# Patient Record
Sex: FEMALE | Race: OTHER | NOT HISPANIC OR LATINO | Employment: UNEMPLOYED | URBAN - METROPOLITAN AREA
[De-identification: names, ages, dates, MRNs, and addresses within clinical notes are randomized per-mention and may not be internally consistent; named-entity substitution may affect disease eponyms.]

---

## 2018-09-28 ENCOUNTER — OCCUPATIONAL HEALTH (OUTPATIENT)
Dept: URGENT CARE | Facility: CLINIC | Age: 48
End: 2018-09-28

## 2018-09-28 DIAGNOSIS — Z02.1 PRE-EMPLOYMENT DRUG SCREENING: Primary | ICD-10-CM

## 2018-09-28 PROCEDURE — 80305 DRUG TEST PRSMV DIR OPT OBS: CPT | Mod: S$GLB,,, | Performed by: NURSE PRACTITIONER

## 2020-11-25 ENCOUNTER — HOSPITAL ENCOUNTER (EMERGENCY)
Facility: HOSPITAL | Age: 50
Discharge: HOME | End: 2020-11-25
Attending: EMERGENCY MEDICINE

## 2020-11-25 VITALS
HEIGHT: 65 IN | WEIGHT: 122 LBS | TEMPERATURE: 99 F | DIASTOLIC BLOOD PRESSURE: 55 MMHG | BODY MASS INDEX: 20.33 KG/M2 | HEART RATE: 62 BPM | OXYGEN SATURATION: 100 % | SYSTOLIC BLOOD PRESSURE: 109 MMHG | RESPIRATION RATE: 18 BRPM

## 2020-11-25 DIAGNOSIS — M54.9 BACK PAIN, UNSPECIFIED BACK LOCATION, UNSPECIFIED BACK PAIN LATERALITY, UNSPECIFIED CHRONICITY: Primary | ICD-10-CM

## 2020-11-25 DIAGNOSIS — R10.9 ABDOMINAL PAIN, UNSPECIFIED ABDOMINAL LOCATION: ICD-10-CM

## 2020-11-25 LAB
ALBUMIN SERPL BCP-MCNC: 4.8 G/DL (ref 3.5–5.2)
ALP SERPL-CCNC: 126 U/L (ref 55–135)
ALT SERPL W/O P-5'-P-CCNC: 19 U/L (ref 10–44)
ANION GAP SERPL CALC-SCNC: 12 MMOL/L (ref 8–16)
AST SERPL-CCNC: 30 U/L (ref 10–40)
BASOPHILS # BLD AUTO: 0.04 K/UL (ref 0–0.2)
BASOPHILS NFR BLD: 0.5 % (ref 0–1.9)
BILIRUB SERPL-MCNC: 0.5 MG/DL (ref 0.1–1)
BUN SERPL-MCNC: 11 MG/DL (ref 6–20)
CALCIUM SERPL-MCNC: 9.5 MG/DL (ref 8.7–10.5)
CHLORIDE SERPL-SCNC: 107 MMOL/L (ref 95–110)
CO2 SERPL-SCNC: 21 MMOL/L (ref 23–29)
CREAT SERPL-MCNC: 1 MG/DL (ref 0.5–1.4)
DIFFERENTIAL METHOD: ABNORMAL
EOSINOPHIL # BLD AUTO: 0.1 K/UL (ref 0–0.5)
EOSINOPHIL NFR BLD: 0.8 % (ref 0–8)
ERYTHROCYTE [DISTWIDTH] IN BLOOD BY AUTOMATED COUNT: 14.1 % (ref 11.5–14.5)
EST. GFR  (AFRICAN AMERICAN): >60 ML/MIN/1.73 M^2
EST. GFR  (NON AFRICAN AMERICAN): >60 ML/MIN/1.73 M^2
GLUCOSE SERPL-MCNC: 139 MG/DL (ref 70–110)
HCT VFR BLD AUTO: 36.5 % (ref 37–48.5)
HGB BLD-MCNC: 11.5 G/DL (ref 12–16)
IMM GRANULOCYTES # BLD AUTO: 0.02 K/UL (ref 0–0.04)
IMM GRANULOCYTES NFR BLD AUTO: 0.3 % (ref 0–0.5)
LIPASE SERPL-CCNC: 26 U/L (ref 4–60)
LYMPHOCYTES # BLD AUTO: 1.8 K/UL (ref 1–4.8)
LYMPHOCYTES NFR BLD: 23.6 % (ref 18–48)
MCH RBC QN AUTO: 26 PG (ref 27–31)
MCHC RBC AUTO-ENTMCNC: 31.5 G/DL (ref 32–36)
MCV RBC AUTO: 82 FL (ref 82–98)
MONOCYTES # BLD AUTO: 0.6 K/UL (ref 0.3–1)
MONOCYTES NFR BLD: 7.7 % (ref 4–15)
NEUTROPHILS # BLD AUTO: 5.1 K/UL (ref 1.8–7.7)
NEUTROPHILS NFR BLD: 67.1 % (ref 38–73)
NRBC BLD-RTO: 0 /100 WBC
PLATELET # BLD AUTO: 286 K/UL (ref 150–350)
PMV BLD AUTO: 11.4 FL (ref 9.2–12.9)
POTASSIUM SERPL-SCNC: 3.3 MMOL/L (ref 3.5–5.1)
PROT SERPL-MCNC: 7.5 G/DL (ref 6–8.4)
RBC # BLD AUTO: 4.43 M/UL (ref 4–5.4)
SODIUM SERPL-SCNC: 140 MMOL/L (ref 136–145)
WBC # BLD AUTO: 7.66 K/UL (ref 3.9–12.7)

## 2020-11-25 PROCEDURE — 83690 ASSAY OF LIPASE: CPT

## 2020-11-25 PROCEDURE — 80053 COMPREHEN METABOLIC PANEL: CPT

## 2020-11-25 PROCEDURE — 99283 EMERGENCY DEPT VISIT LOW MDM: CPT

## 2020-11-25 PROCEDURE — 85025 COMPLETE CBC W/AUTO DIFF WBC: CPT

## 2020-11-25 RX ORDER — ALPRAZOLAM 0.25 MG/1
0.25 TABLET ORAL 3 TIMES DAILY PRN
Qty: 6 TABLET | Refills: 0 | Status: SHIPPED | OUTPATIENT
Start: 2020-11-25 | End: 2021-05-13 | Stop reason: ALTCHOICE

## 2020-11-25 RX ORDER — METHOCARBAMOL 500 MG/1
1000 TABLET, FILM COATED ORAL 4 TIMES DAILY
Qty: 40 TABLET | Refills: 0 | Status: SHIPPED | OUTPATIENT
Start: 2020-11-25 | End: 2020-11-30

## 2020-11-25 RX ORDER — OXYCODONE AND ACETAMINOPHEN 10; 325 MG/1; MG/1
1 TABLET ORAL EVERY 6 HOURS PRN
Qty: 12 TABLET | Refills: 0 | Status: SHIPPED | OUTPATIENT
Start: 2020-11-25 | End: 2020-11-28

## 2020-11-26 NOTE — ED PROVIDER NOTES
Encounter Date: 11/25/2020       History     Chief Complaint   Patient presents with    Abdominal Pain     LLQ x5 days    Fall     Slipped in rain and almost fell and in the process of catching herself injured her neck, back pain along spine, and bilateral shoulders.      CHIEF COMPLAINT IS LOWER BACK NECK AND SHOULDER DISCOMFORT.  THE PATIENT STATES SHE TWISTED HER BODY TRYING TO HOLD HER BALANCE  .  She did not fall.  This occurred today.  She has been having soreness to the areas to the last 7 days.  She also today noted some left lower quadrant sharp stabbing pain that is intermittent in nature lasting 50 seconds.  She denies fever chills earache sore throat runny nose.  She denies chest pain productive cough.  She denies nausea vomiting diarrhea trouble urinating.  The patient denies any troubles with her back in the past.  She does carry a heavy bag she states.  She denies any trauma.  She denies any acute weight loss.  She denies any neurological deficits.  She denies any fever IV drug use or steroid use.  She has no history of cancer.        Review of patient's allergies indicates:   Allergen Reactions    Hydrocodone Itching    Benadryl [diphenhydramine hcl] Other (See Comments)     Agitation      No past medical history on file.  No past surgical history on file.  No family history on file.  Social History     Tobacco Use    Smoking status: Not on file   Substance Use Topics    Alcohol use: Not on file    Drug use: Not on file     Review of Systems   Constitutional: Negative for chills and fever.   HENT: Negative for ear pain, rhinorrhea and sore throat.    Eyes: Negative for pain and visual disturbance.   Respiratory: Negative for cough and shortness of breath.    Cardiovascular: Negative for chest pain and palpitations.   Gastrointestinal: Negative for abdominal pain, constipation, diarrhea, nausea and vomiting.   Genitourinary: Negative for dysuria, frequency, hematuria and urgency.    Musculoskeletal: Negative for back pain, joint swelling and myalgias.        Lower back bilateral shoulder and neck pain   Skin: Negative for rash.   Neurological: Negative for dizziness, seizures, weakness and headaches.   Psychiatric/Behavioral: Negative for dysphoric mood. The patient is not nervous/anxious.        Physical Exam     Initial Vitals [11/25/20 1939]   BP Pulse Resp Temp SpO2   (!) 134/91 85 16 98.3 °F (36.8 °C) 96 %      MAP       --         Physical Exam    Nursing note and vitals reviewed.  Constitutional: She appears well-developed and well-nourished.   HENT:   Head: Normocephalic and atraumatic.   Eyes: Conjunctivae, EOM and lids are normal. Pupils are equal, round, and reactive to light.   Neck: Trachea normal and normal range of motion. Neck supple. No thyroid mass and no thyromegaly present.   Cardiovascular: Normal rate, regular rhythm and normal heart sounds.   Pulmonary/Chest: Effort normal and breath sounds normal.   Abdominal: Soft. Normal appearance and bowel sounds are normal. There is no abdominal tenderness.   Musculoskeletal: Normal range of motion.      Comments: Patient with musculoskeletal back pain paravertebral area L2-L5.  She also is slightly sore over the scapular areas bilaterally with no skin changes no bruising no swelling.  Full range of motion of the shoulders.  No sciatica on testing.  Good motor strength to the lower extremities.  Patient a and O x3 cranial nerves 2-12 intact good motor sensory to all extremities   Neurological: She is alert and oriented to person, place, and time. She has normal strength and normal reflexes. No cranial nerve deficit or sensory deficit.   Skin: Skin is warm and dry.   Psychiatric: She has a normal mood and affect. Her speech is normal and behavior is normal. Judgment and thought content normal.         ED Course   Procedures  Labs Reviewed   CBC W/ AUTO DIFFERENTIAL - Abnormal; Notable for the following components:       Result  Value    Hemoglobin 11.5 (*)     Hematocrit 36.5 (*)     MCH 26.0 (*)     MCHC 31.5 (*)     All other components within normal limits   COMPREHENSIVE METABOLIC PANEL - Abnormal; Notable for the following components:    Potassium 3.3 (*)     CO2 21 (*)     Glucose 139 (*)     All other components within normal limits   LIPASE   URINALYSIS, REFLEX TO URINE CULTURE          Imaging Results    None          Medical Decision Making:   ED Management:  The patient will be discharged with muscle relaxer and pain meds and follow-up.  She can follow up with her family doctor Dr. Cottrell so she get an outpatient colonoscopy for intermittent left lower quadrant pain.  During my exam and during her stay here she had no abdominal pain whatsoever.                             Clinical Impression:     ICD-10-CM ICD-9-CM   1. Back pain, unspecified back location, unspecified back pain laterality, unspecified chronicity  M54.9 724.5   2. Abdominal pain, unspecified abdominal location  R10.9 789.00                          ED Disposition Condition    Discharge Stable        ED Prescriptions     Medication Sig Dispense Start Date End Date Auth. Provider    oxyCODONE-acetaminophen (PERCOCET)  mg per tablet Take 1 tablet by mouth every 6 (six) hours as needed for Pain. 12 tablet 11/25/2020 11/28/2020 Von Mcghee MD    methocarbamoL (ROBAXIN) 500 MG Tab Take 2 tablets (1,000 mg total) by mouth 4 (four) times daily. for 5 days 40 tablet 11/25/2020 11/30/2020 Von Mcghee MD        Follow-up Information    None                                      Von Mcghee MD  11/25/20 7630

## 2021-04-08 NOTE — PROGRESS NOTES
[facility]   Cardiology II Service / Advanced Heart Failure  Daily Progress Note      Patient: Jim Willingham  MRN: [id number]  Admission Date: 2/8/2021  Hospital Day # 59    Assessment and Plan:  Mr. Jim Willingham is a 61yr old male with a history of NICM (LVEF < 30% per TTE 1/2021), s/p HM2 LVAD (6/2017), VT, AFib, DMII, CKD, and COPD who presented with worsening symptoms and for consideration of heart transplantation.  He has been listed for transplant, and is currently status 2E, BG O+.  His exception expires 4/8.  He has demonstrated refractory hypervolemia in spite of aggressive medication titration.    PLAN:  - no changes to plan of care today    Chronic systolic heart failure secondary to NCM (LVEF < 30% per TTE 1/2021)  Stage D, NYHA Class III  - ACEi/ARB/ARNi:  Deferred due to renal function  - Afterload reduction:  Hydralazine 75mg TID and isordil 10mg TID  - BB:  Contraindicated due to low cardiac output  - Aldosterone antagonist:  Deferred due to renal function  - SCD ppx:  CRT-D  - Fluid status:  euvolemic- continue bumex 4mg po twice daily     S/p HMII LVAD (6/2017)  - Antiplatelet:  Aspirin 81mg daily  - Anticoagulation:  Warfarin, dosed per pharmacy with goal INR 2.5-3 (goal increased due to power spikes/elevated flows during this hospitalization).  INR today 2.11   - MAPs:  Goal 65-85, overall at goal  - LDH: 309 (4/2), stable     VT  AFib  HRs controlled.  - Continue amiodarone 200mg once daily  - BB deferred as noted above  - continue warfarin, as noted above     WALTER on CKD III, resolved  Creatinine 2s on admission, has improved and stabilized, ranging ~1.4-1.9 overall.  - continue diuresis, as noted above  - continue to trend BMP daily     DMII  Low cortisol levels  Concern for hypoglycemia, resolved  H/o bariatric surgery  Last HgbA1c 1/2021 was 6.2%.  Has been on prednisone for management of carpal tunnel syndrome for about 7 years, tapered down from 9/2020 and ultimately stopped  Non dot ds onsite [facility]      2/2021.  Developed symptomatic hypoglycemia (BGs 50s on 3/4), so endocrine was consulted.  C-peptide was 13.9, proinsulin was 18.4 (3/4, he was normoglycemic with post-prandial glucose 84 at that time).  Serum cortisol was 7.7 (3/6), cosnytopin stim test was WNL.  Lantus and sliding scale insulin have been discontinued.  He has continued to have hypoglycemia, so was started on acarbose 4/2.  - endocrine consult, appreciate rec's  - continue to monitor BGs q4 hours  - continue acarbose 25mg daily  - per endo --> Fingerstick glucose readings might not be accurate for assessment of hypoglycemia, if the patient has another glucose reading <55 (without insulin therapy), plasma glucose should be sent for confirmation of hypoglycemia prior to correction.  While the patient is off insulin therapy, POC glucose readings above 60 are acceptable for the patient        OTHER:  Carpel tunnel, bilateral:  Evidence of thenar atrophy, s/p steroid injection 11/2020.  S/p bilateral release 2/18/21.  Appreciate ortho/plastics consults, signed off.  Continue gabapentin 300mg/600mg/600mg and tylenol as needed.  COPD/asthma:  Continue PTA breo ellipta, breo incruse, and albuterol as needed.  Rhinovirus, resolved:  S/p 5-day course of cefdinir.  Repeat respiratory virus panel was negative.  Depression:  Continue PTA buproprion.  Staph Hominis Bacteremia:  No need for surveillance blood cx per transplant ID.   Acute Gout flare:  Anakinra for daily ppx and allopurinol, per rheum.      FEN: 3 gram sodium diet   PROPHY:  Coumadin  LINES:  PIV   DISPO:  TBD pending cardiac transplant.   CODE STATUS: Full Code     ============================================================    Interval History/ROS:   Mr. Willingham states that he feels well today. His breathing has been good, and he denies SOB, PND, and orthopnea. No bloating. No Le edema. Mild headache. One episode of dizziness yesterday when he bent down to the fridge, no other lightheadedness.  He  "is eating, with good appetite and denies nausea, vomiting, diarrhea, and constipation.  He  Continues to deny chest pain, palpitations, headaches, acute vision changes, fevers, chills, cough, sore throat, and signs of bleeding.    Last 24 hr care team notes reviewed.   ROS:  4 point ROS including Respiratory, CV, GI and , other than that noted in the HPI, is negative.     Medications: Reviewed in EPIC.     Physical Exam:   BP (!) 79/65   Pulse 71   Temp 97.5  F (36.4  C) (Oral)   Resp 16   Ht 1.727 m (5' 8\")   Wt 99.5 kg (219 lb 4.8 oz)   SpO2 99%   BMI 33.34 kg/m    GENERAL: Appears alert and interacting appropriatly. Sitting upright at edge of bed, NAD.  HEENT: Eye symmetrical and free of discharge bilaterally. Mucous membranes moist and without lesions.  NECK: Supple and without lymphadenopathy. JVD lower third of neck at 45 degrees  CV: +LVAD hum  RESPIRATORY: Respirations regular, even, and unlabored. Lungs CTA throughout.   GI: Soft and non distended with normoactive bowel sounds present in all quadrants. No tenderness, rebound, guarding. No organomegaly.   EXTREMITIES: No LE edema. All extremities are warm and well perfused.  NEUROLOGIC: Alert and interacting appropriatly. No focal deficits.   MUSCULOSKELETAL: No joint swelling or tenderness.   SKIN: No jaundice. No rashes or lesions. Driveline covered, dressing c/d/i..    Data:  CMP  Recent Labs   Lab 04/08/21  0551 04/07/21  0617 04/06/21  0548 04/05/21  0536    136 135 135   POTASSIUM 4.0 4.1 3.9 3.7   CHLORIDE 102 103 102 102   CO2 27 27 26 28   ANIONGAP 8 6 7 6   GLC 89 96 108* 97   BUN 49* 49* 46* 44*   CR 1.58* 1.68* 1.72* 1.91*   GFRESTIMATED 46* 42* 41* 36*   GFRESTBLACK 53* 49* 48* 42*   QAMAR 8.9 8.9 8.5 8.6   MAG 2.6* 2.6* 2.5* 2.5*   PROTTOTAL  --   --   --  6.4*   ALBUMIN  --   --   --  3.6   BILITOTAL  --   --   --  0.5   ALKPHOS  --   --   --  108   AST  --   --   --  35   ALT  --   --   --  40     CBC  Recent Labs   Lab " 04/08/21  0551 04/05/21  0536 04/02/21  0651   WBC 4.3 3.9* 3.7*   RBC 4.48 4.12* 4.18*   HGB 13.5 12.4* 12.2*   HCT 42.7 38.7* 38.6*   MCV 95 94 92   MCH 30.1 30.1 29.2   MCHC 31.6 32.0 31.6   RDW 15.9* 15.9* 16.3*    185 190     INR  Recent Labs   Lab 04/08/21  0551 04/07/21  0617 04/06/21  0548 04/05/21  0536   INR 2.11* 2.32* 2.48* 2.95*       Patient discussed with Dr. Montgomery.      Didi Butler PA-C  [facility] Cardiology

## 2021-05-13 ENCOUNTER — HOSPITAL ENCOUNTER (EMERGENCY)
Facility: HOSPITAL | Age: 51
Discharge: HOME | End: 2021-05-13
Attending: EMERGENCY MEDICINE

## 2021-05-13 VITALS
WEIGHT: 121.94 LBS | DIASTOLIC BLOOD PRESSURE: 67 MMHG | RESPIRATION RATE: 16 BRPM | BODY MASS INDEX: 20.29 KG/M2 | TEMPERATURE: 98 F | HEART RATE: 53 BPM | SYSTOLIC BLOOD PRESSURE: 148 MMHG | OXYGEN SATURATION: 100 %

## 2021-05-13 DIAGNOSIS — G89.29 CHRONIC RIGHT-SIDED LOW BACK PAIN WITH RIGHT-SIDED SCIATICA: Primary | ICD-10-CM

## 2021-05-13 DIAGNOSIS — M54.41 CHRONIC RIGHT-SIDED LOW BACK PAIN WITH RIGHT-SIDED SCIATICA: Primary | ICD-10-CM

## 2021-05-13 PROCEDURE — 63600175 PHARM REV CODE 636 W HCPCS: Performed by: EMERGENCY MEDICINE

## 2021-05-13 PROCEDURE — 96372 THER/PROPH/DIAG INJ SC/IM: CPT

## 2021-05-13 PROCEDURE — 99284 EMERGENCY DEPT VISIT MOD MDM: CPT | Mod: 25

## 2021-05-13 RX ORDER — KETOROLAC TROMETHAMINE 30 MG/ML
15 INJECTION, SOLUTION INTRAMUSCULAR; INTRAVENOUS
Status: COMPLETED | OUTPATIENT
Start: 2021-05-13 | End: 2021-05-13

## 2021-05-13 RX ORDER — KETOROLAC TROMETHAMINE 10 MG/1
10 TABLET, FILM COATED ORAL EVERY 6 HOURS
Qty: 12 TABLET | Refills: 0 | Status: SHIPPED | OUTPATIENT
Start: 2021-05-13 | End: 2021-05-16

## 2021-05-13 RX ORDER — HYDROCODONE BITARTRATE AND ACETAMINOPHEN 5; 325 MG/1; MG/1
1 TABLET ORAL EVERY 6 HOURS PRN
COMMUNITY
End: 2023-02-01

## 2021-05-13 RX ORDER — DIAZEPAM 5 MG/1
5 TABLET ORAL EVERY 6 HOURS PRN
Qty: 10 TABLET | Refills: 0 | Status: SHIPPED | OUTPATIENT
Start: 2021-05-13 | End: 2023-02-01

## 2021-05-13 RX ADMIN — KETOROLAC TROMETHAMINE 15 MG: 30 INJECTION, SOLUTION INTRAMUSCULAR; INTRAVENOUS at 03:05

## 2021-05-14 ENCOUNTER — PATIENT OUTREACH (OUTPATIENT)
Dept: EMERGENCY MEDICINE | Facility: HOSPITAL | Age: 51
End: 2021-05-14

## 2021-08-03 DIAGNOSIS — M54.12 CERVICAL RADICULOPATHY: ICD-10-CM

## 2021-08-03 DIAGNOSIS — M46.1 SACROILIITIS: Primary | ICD-10-CM

## 2022-09-27 ENCOUNTER — OCCUPATIONAL HEALTH (OUTPATIENT)
Dept: URGENT CARE | Facility: CLINIC | Age: 52
End: 2022-09-27

## 2022-09-27 PROCEDURE — 80305 DRUG TEST PRSMV DIR OPT OBS: CPT | Mod: S$GLB,,, | Performed by: EMERGENCY MEDICINE

## 2022-09-27 PROCEDURE — 80305 PR COLLECTION ONLY DRUG SCREEN: ICD-10-PCS | Mod: S$GLB,,, | Performed by: EMERGENCY MEDICINE

## 2023-02-01 ENCOUNTER — LAB VISIT (OUTPATIENT)
Dept: PRIMARY CARE CLINIC | Facility: CLINIC | Age: 53
End: 2023-02-01
Payer: COMMERCIAL

## 2023-02-01 ENCOUNTER — OFFICE VISIT (OUTPATIENT)
Dept: PRIMARY CARE CLINIC | Facility: CLINIC | Age: 53
End: 2023-02-01
Payer: COMMERCIAL

## 2023-02-01 VITALS
HEART RATE: 60 BPM | HEIGHT: 65 IN | DIASTOLIC BLOOD PRESSURE: 77 MMHG | SYSTOLIC BLOOD PRESSURE: 138 MMHG | BODY MASS INDEX: 18.85 KG/M2 | WEIGHT: 113.13 LBS | OXYGEN SATURATION: 100 %

## 2023-02-01 DIAGNOSIS — D50.9 IRON DEFICIENCY ANEMIA, UNSPECIFIED IRON DEFICIENCY ANEMIA TYPE: ICD-10-CM

## 2023-02-01 DIAGNOSIS — E87.6 HYPOKALEMIA: ICD-10-CM

## 2023-02-01 DIAGNOSIS — F41.9 ANXIETY: ICD-10-CM

## 2023-02-01 DIAGNOSIS — E87.6 HYPOKALEMIA: Primary | ICD-10-CM

## 2023-02-01 DIAGNOSIS — Z13.1 DIABETES MELLITUS SCREENING: ICD-10-CM

## 2023-02-01 LAB
ALBUMIN SERPL BCP-MCNC: 4.4 G/DL (ref 3.5–5.2)
ALP SERPL-CCNC: 140 U/L (ref 55–135)
ALT SERPL W/O P-5'-P-CCNC: 15 U/L (ref 10–44)
ANION GAP SERPL CALC-SCNC: 11 MMOL/L (ref 8–16)
AST SERPL-CCNC: 23 U/L (ref 10–40)
BASOPHILS # BLD AUTO: 0.03 K/UL (ref 0–0.2)
BASOPHILS NFR BLD: 0.4 % (ref 0–1.9)
BILIRUB SERPL-MCNC: 0.2 MG/DL (ref 0.1–1)
BUN SERPL-MCNC: 6 MG/DL (ref 6–20)
CALCIUM SERPL-MCNC: 9.7 MG/DL (ref 8.7–10.5)
CHLORIDE SERPL-SCNC: 105 MMOL/L (ref 95–110)
CO2 SERPL-SCNC: 23 MMOL/L (ref 23–29)
CREAT SERPL-MCNC: 0.8 MG/DL (ref 0.5–1.4)
DIFFERENTIAL METHOD: ABNORMAL
EOSINOPHIL # BLD AUTO: 0 K/UL (ref 0–0.5)
EOSINOPHIL NFR BLD: 0.6 % (ref 0–8)
ERYTHROCYTE [DISTWIDTH] IN BLOOD BY AUTOMATED COUNT: 15.2 % (ref 11.5–14.5)
EST. GFR  (NO RACE VARIABLE): >60 ML/MIN/1.73 M^2
ESTIMATED AVG GLUCOSE: 105 MG/DL (ref 68–131)
GLUCOSE SERPL-MCNC: 79 MG/DL (ref 70–110)
HBA1C MFR BLD: 5.3 % (ref 4–5.6)
HCT VFR BLD AUTO: 39.4 % (ref 37–48.5)
HGB BLD-MCNC: 12.1 G/DL (ref 12–16)
IMM GRANULOCYTES # BLD AUTO: 0.01 K/UL (ref 0–0.04)
IMM GRANULOCYTES NFR BLD AUTO: 0.1 % (ref 0–0.5)
LYMPHOCYTES # BLD AUTO: 2.1 K/UL (ref 1–4.8)
LYMPHOCYTES NFR BLD: 31.2 % (ref 18–48)
MCH RBC QN AUTO: 25.4 PG (ref 27–31)
MCHC RBC AUTO-ENTMCNC: 30.7 G/DL (ref 32–36)
MCV RBC AUTO: 83 FL (ref 82–98)
MONOCYTES # BLD AUTO: 0.6 K/UL (ref 0.3–1)
MONOCYTES NFR BLD: 8.2 % (ref 4–15)
NEUTROPHILS # BLD AUTO: 4 K/UL (ref 1.8–7.7)
NEUTROPHILS NFR BLD: 59.5 % (ref 38–73)
NRBC BLD-RTO: 0 /100 WBC
PLATELET # BLD AUTO: 384 K/UL (ref 150–450)
PMV BLD AUTO: 12 FL (ref 9.2–12.9)
POTASSIUM SERPL-SCNC: 4.6 MMOL/L (ref 3.5–5.1)
PROT SERPL-MCNC: 7.9 G/DL (ref 6–8.4)
RBC # BLD AUTO: 4.76 M/UL (ref 4–5.4)
SODIUM SERPL-SCNC: 139 MMOL/L (ref 136–145)
TSH SERPL DL<=0.005 MIU/L-ACNC: 0.97 UIU/ML (ref 0.4–4)
WBC # BLD AUTO: 6.79 K/UL (ref 3.9–12.7)

## 2023-02-01 PROCEDURE — 99999 PR PBB SHADOW E&M-EST. PATIENT-LVL III: ICD-10-PCS | Mod: PBBFAC,,, | Performed by: STUDENT IN AN ORGANIZED HEALTH CARE EDUCATION/TRAINING PROGRAM

## 2023-02-01 PROCEDURE — 3078F PR MOST RECENT DIASTOLIC BLOOD PRESSURE < 80 MM HG: ICD-10-PCS | Mod: CPTII,S$GLB,, | Performed by: STUDENT IN AN ORGANIZED HEALTH CARE EDUCATION/TRAINING PROGRAM

## 2023-02-01 PROCEDURE — 3044F HG A1C LEVEL LT 7.0%: CPT | Mod: CPTII,S$GLB,, | Performed by: STUDENT IN AN ORGANIZED HEALTH CARE EDUCATION/TRAINING PROGRAM

## 2023-02-01 PROCEDURE — 3008F PR BODY MASS INDEX (BMI) DOCUMENTED: ICD-10-PCS | Mod: CPTII,S$GLB,, | Performed by: STUDENT IN AN ORGANIZED HEALTH CARE EDUCATION/TRAINING PROGRAM

## 2023-02-01 PROCEDURE — 99203 OFFICE O/P NEW LOW 30 MIN: CPT | Mod: S$GLB,,, | Performed by: STUDENT IN AN ORGANIZED HEALTH CARE EDUCATION/TRAINING PROGRAM

## 2023-02-01 PROCEDURE — 3008F BODY MASS INDEX DOCD: CPT | Mod: CPTII,S$GLB,, | Performed by: STUDENT IN AN ORGANIZED HEALTH CARE EDUCATION/TRAINING PROGRAM

## 2023-02-01 PROCEDURE — 99203 PR OFFICE/OUTPT VISIT, NEW, LEVL III, 30-44 MIN: ICD-10-PCS | Mod: S$GLB,,, | Performed by: STUDENT IN AN ORGANIZED HEALTH CARE EDUCATION/TRAINING PROGRAM

## 2023-02-01 PROCEDURE — 1159F MED LIST DOCD IN RCRD: CPT | Mod: CPTII,S$GLB,, | Performed by: STUDENT IN AN ORGANIZED HEALTH CARE EDUCATION/TRAINING PROGRAM

## 2023-02-01 PROCEDURE — 84443 ASSAY THYROID STIM HORMONE: CPT | Performed by: STUDENT IN AN ORGANIZED HEALTH CARE EDUCATION/TRAINING PROGRAM

## 2023-02-01 PROCEDURE — 3078F DIAST BP <80 MM HG: CPT | Mod: CPTII,S$GLB,, | Performed by: STUDENT IN AN ORGANIZED HEALTH CARE EDUCATION/TRAINING PROGRAM

## 2023-02-01 PROCEDURE — 80053 COMPREHEN METABOLIC PANEL: CPT | Performed by: STUDENT IN AN ORGANIZED HEALTH CARE EDUCATION/TRAINING PROGRAM

## 2023-02-01 PROCEDURE — 3075F SYST BP GE 130 - 139MM HG: CPT | Mod: CPTII,S$GLB,, | Performed by: STUDENT IN AN ORGANIZED HEALTH CARE EDUCATION/TRAINING PROGRAM

## 2023-02-01 PROCEDURE — 3075F PR MOST RECENT SYSTOLIC BLOOD PRESS GE 130-139MM HG: ICD-10-PCS | Mod: CPTII,S$GLB,, | Performed by: STUDENT IN AN ORGANIZED HEALTH CARE EDUCATION/TRAINING PROGRAM

## 2023-02-01 PROCEDURE — 99999 PR PBB SHADOW E&M-EST. PATIENT-LVL III: CPT | Mod: PBBFAC,,, | Performed by: STUDENT IN AN ORGANIZED HEALTH CARE EDUCATION/TRAINING PROGRAM

## 2023-02-01 PROCEDURE — 85025 COMPLETE CBC W/AUTO DIFF WBC: CPT | Performed by: STUDENT IN AN ORGANIZED HEALTH CARE EDUCATION/TRAINING PROGRAM

## 2023-02-01 PROCEDURE — 83036 HEMOGLOBIN GLYCOSYLATED A1C: CPT | Performed by: STUDENT IN AN ORGANIZED HEALTH CARE EDUCATION/TRAINING PROGRAM

## 2023-02-01 PROCEDURE — 36415 COLL VENOUS BLD VENIPUNCTURE: CPT | Performed by: STUDENT IN AN ORGANIZED HEALTH CARE EDUCATION/TRAINING PROGRAM

## 2023-02-01 PROCEDURE — 3044F PR MOST RECENT HEMOGLOBIN A1C LEVEL <7.0%: ICD-10-PCS | Mod: CPTII,S$GLB,, | Performed by: STUDENT IN AN ORGANIZED HEALTH CARE EDUCATION/TRAINING PROGRAM

## 2023-02-01 PROCEDURE — 1159F PR MEDICATION LIST DOCUMENTED IN MEDICAL RECORD: ICD-10-PCS | Mod: CPTII,S$GLB,, | Performed by: STUDENT IN AN ORGANIZED HEALTH CARE EDUCATION/TRAINING PROGRAM

## 2023-02-01 RX ORDER — VENLAFAXINE HYDROCHLORIDE 37.5 MG/1
37.5 CAPSULE, EXTENDED RELEASE ORAL DAILY
Qty: 30 CAPSULE | Refills: 2 | Status: SHIPPED | OUTPATIENT
Start: 2023-02-01 | End: 2023-03-03 | Stop reason: SINTOL

## 2023-02-01 NOTE — PROGRESS NOTES
02/01/2023    Rehana Fabienne  [id number]    Chief Complaint   Patient presents with    \[facility] [place] [facility]\"" Care              HPI    This pt is new to me and presents for worsening anxiety over the years. Pt is currently on no chronic medications. Pmh of lower back pain.      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 has nelly increasing over the last 5 years after two divorces  Sleep is off and on through the night; no sleep medication  Diet: intake fluctuates, fast food   Hx of taking lexapro years ago after a divorce   Did not find lexapro helpful and does not recall a good experience with the other SSRI   Is more a natural person and does not like the idea of medication; is not currently taking any vitamins or natural substances  No hx of therapy and does not think that would be a good fit because she is a very private person  Exercise: not as much as before  Concentration is decreased: trouble with numbers and names      Negative 10 point ROS outside of HPI    Social History     Socioeconomic History    Marital status: Single   Tobacco Use    Smoking status: Never   Substance and Sexual Activity    Alcohol use: Yes     Comment: socially    Drug use: Not Currently    Sexual activity: Yes     Partners: Male         No current outpatient medications on file.      Physical Exam  Vitals:    02/01/23 1121   BP: 138/77   Pulse: 60     Gen: well appearing, NAD  Resp: non labored breathing, no crackles, no wheezes, CTAB  CV: RRR no murmur, gallops, rubs, no LE edema  Abd: soft nontender BS present no organomegaly    1. Hx of Hypokalemia  - Comprehensive Metabolic Panel; Future    2. Iron deficiency anemia, unspecified iron deficiency anemia type  - CBC Auto Differential; Future    3. Diabetes mellitus screening  - Hemoglobin A1C; Future    4. Anxiety  - TSH; Future  - START venlafaxine (EFFEXOR-XR) 37.5 MG 24 hr capsule; Take 1 capsule  (37.5 mg total) by mouth once daily.  Dispense: 30 capsule; Refill: 2  Recommended therapy; pt decline at this time  Recommended lifestyle changes including exercising and well balanced diet    RTC in 4 weeks for medication check    Savana Acevedo MD  Family Medicine       O-T Plasty Text: The defect edges were debeveled with a #15 scalpel blade.  Given the location of the defect, shape of the defect and the proximity to free margins an O-T plasty was deemed most appropriate.  Using a sterile surgical marker, an appropriate O-T plasty was drawn incorporating the defect and placing the expected incisions within the relaxed skin tension lines where possible.    The area thus outlined was incised deep to adipose tissue with a #15 scalpel blade.  The skin margins were undermined to an appropriate distance in all directions utilizing iris scissors.

## 2023-02-06 NOTE — PROGRESS NOTES
Please let patient know that her labs are good/stable when compared to previous and can be repeated as needed.

## 2023-02-23 ENCOUNTER — TELEPHONE (OUTPATIENT)
Dept: PRIMARY CARE CLINIC | Facility: CLINIC | Age: 53
End: 2023-02-23
Payer: COMMERCIAL

## 2023-02-23 NOTE — TELEPHONE ENCOUNTER
Pt states effexor is not working and she is stopping it, states she needs something called in for UTI ASAP.

## 2023-02-23 NOTE — TELEPHONE ENCOUNTER
Pt requires so type of visit; we do not prescribe antibiotics without a visit. She can be schedule for a virtual with any available provider

## 2023-02-23 NOTE — TELEPHONE ENCOUNTER
----- Message from Ludmila Jaeger sent at 2/23/2023  9:43 AM CST -----  Regarding: Request for Sooner Apt  Type:  Sooner Appointment Request    Patient is requesting a sooner appointment.  Patient declined first available appointment listed as well as another facility and provider .  Patient will not accept being placed on the waitlist and is requesting a message be sent to doctor.    Name of Caller: Self     When is the first available appointment? 3/15/23    Symptoms: UTI Said that she has called several times and never heard back.     Would the patient rather a call back or a response via [facility]? Call     Best Call Back Number: .[phone]    Urine is cloudy

## 2023-02-23 NOTE — TELEPHONE ENCOUNTER
----- Message from Mickie Hale sent at 2/23/2023 10:51 AM CST -----  Contact: [phone] Patient  Patient is returning a phone call.  Who left a message for the patient: Ivonne De La Rosa LPN  Does patient know what this is regarding:  antibiotic to be called in for a UTI and venlafaxine (EFFEXOR-XR) 37.5 MG 24 hr capsule is having very bad side effects. 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

## 2023-02-28 PROCEDURE — 99283 EMERGENCY DEPT VISIT LOW MDM: CPT

## 2023-03-01 ENCOUNTER — HOSPITAL ENCOUNTER (EMERGENCY)
Facility: HOSPITAL | Age: 53
Discharge: HOME | End: 2023-03-01
Attending: STUDENT IN AN ORGANIZED HEALTH CARE EDUCATION/TRAINING PROGRAM
Payer: COMMERCIAL

## 2023-03-01 ENCOUNTER — PATIENT MESSAGE (OUTPATIENT)
Dept: ADMINISTRATIVE | Facility: HOSPITAL | Age: 53
End: 2023-03-01
Payer: COMMERCIAL

## 2023-03-01 VITALS
HEART RATE: 54 BPM | SYSTOLIC BLOOD PRESSURE: 140 MMHG | TEMPERATURE: 98 F | BODY MASS INDEX: 18.83 KG/M2 | DIASTOLIC BLOOD PRESSURE: 78 MMHG | HEIGHT: 65 IN | WEIGHT: 113 LBS | RESPIRATION RATE: 18 BRPM | OXYGEN SATURATION: 100 %

## 2023-03-01 DIAGNOSIS — Z12.31 OTHER SCREENING MAMMOGRAM: ICD-10-CM

## 2023-03-01 DIAGNOSIS — Z12.11 COLON CANCER SCREENING: ICD-10-CM

## 2023-03-01 DIAGNOSIS — N12 PYELONEPHRITIS: Primary | ICD-10-CM

## 2023-03-01 LAB
BACTERIA #/AREA URNS HPF: ABNORMAL /HPF
BILIRUB UR QL STRIP: NEGATIVE
CLARITY UR: ABNORMAL
COLOR UR: YELLOW
GLUCOSE UR QL STRIP: NEGATIVE
HGB UR QL STRIP: ABNORMAL
KETONES UR QL STRIP: NEGATIVE
LEUKOCYTE ESTERASE UR QL STRIP: ABNORMAL
MICROSCOPIC COMMENT: ABNORMAL
NITRITE UR QL STRIP: NEGATIVE
PH UR STRIP: 6.5 [PH] (ref 5–8)
PROT UR QL STRIP: NEGATIVE
RBC #/AREA URNS HPF: 1 /HPF (ref 0–4)
SP GR UR STRIP: 1 (ref 1–1.03)
SQUAMOUS #/AREA URNS HPF: 1 /HPF
URN SPEC COLLECT METH UR: ABNORMAL
UROBILINOGEN UR STRIP-ACNC: NEGATIVE EU/DL
WBC #/AREA URNS HPF: 12 /HPF (ref 0–5)

## 2023-03-01 PROCEDURE — 87088 URINE BACTERIA CULTURE: CPT | Performed by: STUDENT IN AN ORGANIZED HEALTH CARE EDUCATION/TRAINING PROGRAM

## 2023-03-01 PROCEDURE — 87186 SC STD MICRODIL/AGAR DIL: CPT | Performed by: STUDENT IN AN ORGANIZED HEALTH CARE EDUCATION/TRAINING PROGRAM

## 2023-03-01 PROCEDURE — 25000003 PHARM REV CODE 250: Performed by: STUDENT IN AN ORGANIZED HEALTH CARE EDUCATION/TRAINING PROGRAM

## 2023-03-01 PROCEDURE — 87086 URINE CULTURE/COLONY COUNT: CPT | Performed by: STUDENT IN AN ORGANIZED HEALTH CARE EDUCATION/TRAINING PROGRAM

## 2023-03-01 PROCEDURE — 81000 URINALYSIS NONAUTO W/SCOPE: CPT | Performed by: STUDENT IN AN ORGANIZED HEALTH CARE EDUCATION/TRAINING PROGRAM

## 2023-03-01 PROCEDURE — 87077 CULTURE AEROBIC IDENTIFY: CPT | Performed by: STUDENT IN AN ORGANIZED HEALTH CARE EDUCATION/TRAINING PROGRAM

## 2023-03-01 RX ORDER — CIPROFLOXACIN 500 MG/1
500 TABLET ORAL 2 TIMES DAILY
Qty: 14 TABLET | Refills: 0 | Status: SHIPPED | OUTPATIENT
Start: 2023-03-01 | End: 2023-03-08

## 2023-03-01 RX ORDER — CIPROFLOXACIN 500 MG/1
500 TABLET ORAL
Status: COMPLETED | OUTPATIENT
Start: 2023-03-01 | End: 2023-03-01

## 2023-03-01 RX ADMIN — CIPROFLOXACIN HYDROCHLORIDE 500 MG: 500 TABLET, FILM COATED ORAL at 12:03

## 2023-03-01 NOTE — ED NOTES
Rehana AndersonCassandraTomás presents to the ED with c/o suprapubic pain with dysuria that started 6 days ago. Patient reports some intermittent right sided flank pain.   NEIL VSS  Verified patient's name and date of birth.

## 2023-03-01 NOTE — ED PROVIDER NOTES
Encounter Date: 2/28/2023    SCRIBE #1 NOTE: I, Clarisa Ohara, am scribing for, and in the presence of,  Tuan Trujillo MD.     History     Chief Complaint   Patient presents with    Urinary Tract Infection     Urgency, frequency, burning, foul smelling urine     Time seen by provider: 12:25 AM on 03/01/2023    Rehana Loving is a 52 y.o. female who presents to the ED with an onset of burning pain with urination, foul smelling urine, cloudy urine, slight blood in the urine, urgency, fever, and chills that began 7 days ago. Patient states she recently started Effexor just prior to her symptoms and called her PCP to inform them she would be stopping the medication. Patient plans on following up with her PCP later this week. The patient denies nausea or any other symptoms at this time. She has a PMHx of anemia and a PSHx of hysterectomy.      The history is provided by the patient.   Review of patient's allergies indicates:   Allergen Reactions    Hydrocodone Itching    Benadryl [diphenhydramine hcl] Other (See Comments)     Agitation      Past Medical History:   Diagnosis Date    Anemia     Blood type, Rh negative      Past Surgical History:   Procedure Laterality Date    HYSTERECTOMY       Family History   Problem Relation Age of Onset    Cancer Mother     No Known Problems Sister     No Known Problems Brother     No Known Problems Daughter     No Known Problems Son      Social History     Tobacco Use    Smoking status: Never   Substance Use Topics    Alcohol use: Yes     Comment: socially    Drug use: Not Currently     Review of Systems   Constitutional:  Positive for chills and fever.   HENT:  Negative for sore throat.    Respiratory:  Negative for shortness of breath.    Cardiovascular:  Negative for chest pain.   Gastrointestinal:  Negative for nausea.   Genitourinary:  Positive for dysuria, hematuria and urgency.        Positive for cloudy urine. Positive for odorous urine.   Musculoskeletal:  Negative  for back pain.   Skin:  Negative for rash.   Neurological:  Negative for weakness.   Hematological:  Does not bruise/bleed easily.     Physical Exam     Initial Vitals [03/01/23 0008]   BP Pulse Resp Temp SpO2   (!) 146/77 61 18 97.8 °F (36.6 °C) 100 %      MAP       --         Physical Exam    Nursing note and vitals reviewed.  Constitutional: She appears well-developed and well-nourished.   HENT:   Head: Normocephalic and atraumatic.   Right Ear: External ear normal.   Left Ear: External ear normal.   Nose: Nose normal.   Eyes: Conjunctivae and EOM are normal. Pupils are equal, round, and reactive to light.   Neck: Neck supple. No tracheal deviation present.   Normal range of motion.  Cardiovascular:  Normal rate, regular rhythm and normal heart sounds.     Exam reveals no gallop and no friction rub.       No murmur heard.  Pulmonary/Chest: Effort normal and breath sounds normal. No respiratory distress. She has no wheezes. She has no rhonchi. She has no rales.   Abdominal: Abdomen is soft. She exhibits no distension. There is no abdominal tenderness.   Musculoskeletal:         General: No edema. Normal range of motion.      Cervical back: Normal range of motion and neck supple.     Neurological: She is alert and oriented to person, place, and time. She has normal strength. No cranial nerve deficit or sensory deficit. Gait normal.   Skin: Skin is warm and dry. No erythema.   Psychiatric: She has a normal mood and affect. Her behavior is normal. Thought content normal.       ED Course   Procedures  Labs Reviewed   URINALYSIS, REFLEX TO URINE CULTURE - Abnormal; Notable for the following components:       Result Value    Appearance, UA Hazy (*)     Occult Blood UA Trace (*)     Leukocytes, UA 1+ (*)     All other components within normal limits    Narrative:     Specimen Source->Urine   URINALYSIS MICROSCOPIC - Abnormal; Notable for the following components:    WBC, UA 12 (*)     Bacteria Many (*)     All other  components within normal limits    Narrative:     Specimen Source->Urine   CULTURE, URINE          Imaging Results    None          Medications   ciprofloxacin HCl tablet 500 mg (500 mg Oral Given 3/1/23 0050)     Medical Decision Making:   History:   Old Medical Records: I decided to obtain old medical records.  Clinical Tests:   Lab Tests: Ordered and Reviewed  ED Management:  Patient presenting with flank/back pain and subjective fever. Differential included UTI, pyelonephritis, diverticulitis, nephrolithiasis, appendicitis, cholangitis. Also considered but less likely given history and physical exam included constipation, bowel perforation, gastritis, pancreatitis, mesenteric ischemia, torsion. Patient afebrile at time of presentation. UA showing positive LE, 12 WBC, and many bacteria. Given this, feel pyelonephritis is the most likely cause of symptoms. Abx coverage based upon local patterns of infection. Cultures pending. Follow up with PCP this week. Return precautions given for worsening or changing symptoms.          Scribe Attestation:   Scribe #1: I performed the above scribed service and the documentation accurately describes the services I performed. I attest to the accuracy of the note.                 I, Tuan Trujillo MD personally performed the services described in this documentation. All medical record entries made by the scribe were at my direction and in my presence.  I have reviewed the chart and agree that the record reflects my personal performance and is accurate and complete.   7:37 PM 03/01/2023       DISCLAIMER: This note was prepared with [employer] voice recognition transcription software. Garbled syntax, mangled pronouns, and other bizarre constructions may be attributed to that software system.    Clinical Impression:   Final diagnoses:  [N12] Pyelonephritis (Primary)        ED Disposition Condition    Discharge Stable          ED Prescriptions       Medication Sig Dispense  Start Date End Date Auth. Provider    ciprofloxacin HCl (CIPRO) 500 MG tablet Take 1 tablet (500 mg total) by mouth 2 (two) times daily. for 7 days 14 tablet 3/1/2023 3/8/2023 Tuan Trujillo MD          Follow-up Information       Follow up With Specialties Details Why Contact Info    Primary care provider of your choice  Schedule an appointment as soon as possible for a visit in 3 days For follow-up on today's visit.     [facility] Emergency Dept Emergency Medicine Go to  As needed, If symptoms worsen [address] [place] [id number] [phone]             Tuan Trujillo MD  03/01/23 1940

## 2023-03-01 NOTE — ED NOTES
Upon discharge, patient is AAOx4, no cardiac or respiratory complications. Follow up care and  Medications have been reviewed with patient and has been instructed to return to the ER if needed. Patient verbalized understanding and ambulated to the lobby without difficulty. JIGNA TREJO.

## 2023-03-03 ENCOUNTER — OFFICE VISIT (OUTPATIENT)
Dept: PRIMARY CARE CLINIC | Facility: CLINIC | Age: 53
End: 2023-03-03
Payer: COMMERCIAL

## 2023-03-03 VITALS
DIASTOLIC BLOOD PRESSURE: 59 MMHG | HEIGHT: 65 IN | HEART RATE: 60 BPM | SYSTOLIC BLOOD PRESSURE: 123 MMHG | BODY MASS INDEX: 18.3 KG/M2 | WEIGHT: 109.81 LBS | OXYGEN SATURATION: 99 %

## 2023-03-03 DIAGNOSIS — N10 PYELONEPHRITIS, ACUTE: Primary | ICD-10-CM

## 2023-03-03 DIAGNOSIS — Z12.11 SCREENING FOR COLON CANCER: ICD-10-CM

## 2023-03-03 DIAGNOSIS — F41.9 ANXIETY: ICD-10-CM

## 2023-03-03 DIAGNOSIS — F33.9 DEPRESSION, RECURRENT: ICD-10-CM

## 2023-03-03 LAB — BACTERIA UR CULT: ABNORMAL

## 2023-03-03 PROCEDURE — 3044F PR MOST RECENT HEMOGLOBIN A1C LEVEL <7.0%: ICD-10-PCS | Mod: CPTII,S$GLB,, | Performed by: STUDENT IN AN ORGANIZED HEALTH CARE EDUCATION/TRAINING PROGRAM

## 2023-03-03 PROCEDURE — 3074F PR MOST RECENT SYSTOLIC BLOOD PRESSURE < 130 MM HG: ICD-10-PCS | Mod: CPTII,S$GLB,, | Performed by: STUDENT IN AN ORGANIZED HEALTH CARE EDUCATION/TRAINING PROGRAM

## 2023-03-03 PROCEDURE — 3008F BODY MASS INDEX DOCD: CPT | Mod: CPTII,S$GLB,, | Performed by: STUDENT IN AN ORGANIZED HEALTH CARE EDUCATION/TRAINING PROGRAM

## 2023-03-03 PROCEDURE — 99999 PR PBB SHADOW E&M-EST. PATIENT-LVL III: CPT | Mod: PBBFAC,,, | Performed by: STUDENT IN AN ORGANIZED HEALTH CARE EDUCATION/TRAINING PROGRAM

## 2023-03-03 PROCEDURE — 1159F PR MEDICATION LIST DOCUMENTED IN MEDICAL RECORD: ICD-10-PCS | Mod: CPTII,S$GLB,, | Performed by: STUDENT IN AN ORGANIZED HEALTH CARE EDUCATION/TRAINING PROGRAM

## 2023-03-03 PROCEDURE — 99214 PR OFFICE/OUTPT VISIT, EST, LEVL IV, 30-39 MIN: ICD-10-PCS | Mod: S$GLB,,, | Performed by: STUDENT IN AN ORGANIZED HEALTH CARE EDUCATION/TRAINING PROGRAM

## 2023-03-03 PROCEDURE — 3078F DIAST BP <80 MM HG: CPT | Mod: CPTII,S$GLB,, | Performed by: STUDENT IN AN ORGANIZED HEALTH CARE EDUCATION/TRAINING PROGRAM

## 2023-03-03 PROCEDURE — 1159F MED LIST DOCD IN RCRD: CPT | Mod: CPTII,S$GLB,, | Performed by: STUDENT IN AN ORGANIZED HEALTH CARE EDUCATION/TRAINING PROGRAM

## 2023-03-03 PROCEDURE — 3074F SYST BP LT 130 MM HG: CPT | Mod: CPTII,S$GLB,, | Performed by: STUDENT IN AN ORGANIZED HEALTH CARE EDUCATION/TRAINING PROGRAM

## 2023-03-03 PROCEDURE — 3078F PR MOST RECENT DIASTOLIC BLOOD PRESSURE < 80 MM HG: ICD-10-PCS | Mod: CPTII,S$GLB,, | Performed by: STUDENT IN AN ORGANIZED HEALTH CARE EDUCATION/TRAINING PROGRAM

## 2023-03-03 PROCEDURE — 3044F HG A1C LEVEL LT 7.0%: CPT | Mod: CPTII,S$GLB,, | Performed by: STUDENT IN AN ORGANIZED HEALTH CARE EDUCATION/TRAINING PROGRAM

## 2023-03-03 PROCEDURE — 3008F PR BODY MASS INDEX (BMI) DOCUMENTED: ICD-10-PCS | Mod: CPTII,S$GLB,, | Performed by: STUDENT IN AN ORGANIZED HEALTH CARE EDUCATION/TRAINING PROGRAM

## 2023-03-03 PROCEDURE — 99214 OFFICE O/P EST MOD 30 MIN: CPT | Mod: S$GLB,,, | Performed by: STUDENT IN AN ORGANIZED HEALTH CARE EDUCATION/TRAINING PROGRAM

## 2023-03-03 PROCEDURE — 99999 PR PBB SHADOW E&M-EST. PATIENT-LVL III: ICD-10-PCS | Mod: PBBFAC,,, | Performed by: STUDENT IN AN ORGANIZED HEALTH CARE EDUCATION/TRAINING PROGRAM

## 2023-03-03 RX ORDER — FERROUS SULFATE 324(65)MG
324 TABLET, DELAYED RELEASE (ENTERIC COATED) ORAL DAILY
Qty: 30 TABLET | Refills: 1 | Status: SHIPPED | OUTPATIENT
Start: 2023-03-03 | End: 2023-06-15

## 2023-03-03 RX ORDER — CITALOPRAM 10 MG/1
10 TABLET ORAL DAILY
Qty: 30 TABLET | Refills: 1 | Status: SHIPPED | OUTPATIENT
Start: 2023-03-03 | End: 2023-07-19

## 2023-03-03 RX ORDER — PHENAZOPYRIDINE HYDROCHLORIDE 200 MG/1
200 TABLET, FILM COATED ORAL
Qty: 30 TABLET | Refills: 0 | Status: SHIPPED | OUTPATIENT
Start: 2023-03-03 | End: 2023-03-13

## 2023-03-03 RX ORDER — ACETAMINOPHEN AND CODEINE PHOSPHATE 300; 30 MG/1; MG/1
1 TABLET ORAL EVERY 4 HOURS PRN
Qty: 20 TABLET | Refills: 0 | Status: SHIPPED | OUTPATIENT
Start: 2023-03-03 | End: 2023-03-13

## 2023-03-03 NOTE — PROGRESS NOTES
03/03/2023    Rehana Loving  [id number]      HPI    Pyelonephritis   Dx with on 3/1 started cipro. Is feeling slightly better but still with urgency and bilateral flank pain. Is trying to increase hydration, but she is not always make it to the RR in time.    KATYA/depression  Effexor 37.5 mg: tried taking for one month but stopped b/c was more irritable and yelling at people.   She would like to take something else, but not sure what would work.  Hx of lexapro but that did work  May be prozac but not helpful  Sertraline sounds familiar but not sure if she took it  Hx of mirtazapine but made her very drowsy; she supplemented with adderall and xanax 2/2 sleepiness but it did work        Negative 10 point ROS outside of HPI    Social History     Socioeconomic History    Marital status: Single   Tobacco Use    Smoking status: Never   Substance and Sexual Activity    Alcohol use: Yes     Comment: socially    Drug use: Not Currently    Sexual activity: Yes     Partners: Male           Current Outpatient Medications:     ciprofloxacin HCl (CIPRO) 500 MG tablet, Take 1 tablet (500 mg total) by mouth 2 (two) times daily. for 7 days, Disp: 14 tablet, Rfl: 0    venlafaxine (EFFEXOR-XR) 37.5 MG 24 hr capsule, Take 1 capsule (37.5 mg total) by mouth once daily., Disp: 30 capsule, Rfl: 2      Physical Exam  Vitals:    03/03/23 0939   BP: (!) 123/59   Pulse: 60       Physical Exam      Gen: well appearing, NAD  Resp: non labored breathing, no crackles, no wheezes, CTAB  CV: RRR no murmur, gallops, rubs, no LE edema  Flanks: bilateral tenderness to light touch  psych: agitated     1. Pyelonephritis, acute  Urine culture gram negative rose > 100k  - phenazopyridine (PYRIDIUM) 200 MG tablet; Take 1 tablet (200 mg total) by mouth 3 (three) times daily with meals. for 10 days  Dispense: 30 tablet; Refill: 0  - acetaminophen-codeine 300-30mg (TYLENOL #3) 300-30 mg Tab; Take 1 tablet by mouth every 4 (four) hours as needed.  Dispense: 20  tablet; Refill: 0  -continue cipro as prescribed     2. Anxiety/ depression  -START  citalopram (CELEXA) 10 MG tablet; Take 1 tablet (10 mg total) by mouth once daily.  Dispense: 30 tablet; Refill: 1  -stop Effexor  Recommended 6 week med check    Iron sent to pt pharmacy by request    RTC in 6 weeks med check    Savana Acevedo MD  Family Medicine

## 2023-04-18 ENCOUNTER — PATIENT MESSAGE (OUTPATIENT)
Dept: PRIMARY CARE CLINIC | Facility: CLINIC | Age: 53
End: 2023-04-18
Payer: COMMERCIAL

## 2023-04-20 DIAGNOSIS — F41.9 ANXIETY: Primary | ICD-10-CM

## 2023-04-20 RX ORDER — MIRTAZAPINE 7.5 MG/1
7.5 TABLET, FILM COATED ORAL NIGHTLY
Qty: 30 TABLET | Refills: 1 | Status: SHIPPED | OUTPATIENT
Start: 2023-04-20 | End: 2023-07-18 | Stop reason: SDUPTHER

## 2023-05-21 ENCOUNTER — HOSPITAL ENCOUNTER (EMERGENCY)
Facility: HOSPITAL | Age: 53
Discharge: HOME | End: 2023-05-21
Attending: EMERGENCY MEDICINE
Payer: COMMERCIAL

## 2023-05-21 VITALS
DIASTOLIC BLOOD PRESSURE: 59 MMHG | HEART RATE: 50 BPM | SYSTOLIC BLOOD PRESSURE: 102 MMHG | TEMPERATURE: 99 F | OXYGEN SATURATION: 100 % | RESPIRATION RATE: 16 BRPM | BODY MASS INDEX: 19.83 KG/M2 | HEIGHT: 65 IN | WEIGHT: 119 LBS

## 2023-05-21 DIAGNOSIS — K59.00 CONSTIPATION: ICD-10-CM

## 2023-05-21 DIAGNOSIS — G89.29 CHRONIC BACK PAIN, UNSPECIFIED BACK LOCATION, UNSPECIFIED BACK PAIN LATERALITY: Primary | ICD-10-CM

## 2023-05-21 DIAGNOSIS — K64.9 HEMORRHOIDS, UNSPECIFIED HEMORRHOID TYPE: ICD-10-CM

## 2023-05-21 DIAGNOSIS — M54.9 CHRONIC BACK PAIN, UNSPECIFIED BACK LOCATION, UNSPECIFIED BACK PAIN LATERALITY: Primary | ICD-10-CM

## 2023-05-21 LAB
BACTERIA #/AREA URNS HPF: NEGATIVE /HPF
BILIRUB UR QL STRIP: NEGATIVE
CLARITY UR: CLEAR
COLOR UR: YELLOW
GLUCOSE UR QL STRIP: NEGATIVE
HGB UR QL STRIP: NEGATIVE
HYALINE CASTS #/AREA URNS LPF: 3 /LPF
KETONES UR QL STRIP: NEGATIVE
LEUKOCYTE ESTERASE UR QL STRIP: ABNORMAL
MICROSCOPIC COMMENT: ABNORMAL
NITRITE UR QL STRIP: NEGATIVE
PH UR STRIP: 6 [PH] (ref 5–8)
PROT UR QL STRIP: ABNORMAL
RBC #/AREA URNS HPF: 0 /HPF (ref 0–4)
SP GR UR STRIP: >1.03 (ref 1–1.03)
SQUAMOUS #/AREA URNS HPF: 1 /HPF
URN SPEC COLLECT METH UR: ABNORMAL
UROBILINOGEN UR STRIP-ACNC: NEGATIVE EU/DL
WBC #/AREA URNS HPF: 1 /HPF (ref 0–5)

## 2023-05-21 PROCEDURE — 81001 URINALYSIS AUTO W/SCOPE: CPT | Performed by: NURSE PRACTITIONER

## 2023-05-21 PROCEDURE — 99284 EMERGENCY DEPT VISIT MOD MDM: CPT

## 2023-05-21 RX ORDER — METHOCARBAMOL 500 MG/1
500 TABLET, FILM COATED ORAL 3 TIMES DAILY
Qty: 21 TABLET | Refills: 0 | Status: SHIPPED | OUTPATIENT
Start: 2023-05-21 | End: 2023-05-28

## 2023-05-21 RX ORDER — DICLOFENAC SODIUM 50 MG/1
50 TABLET, DELAYED RELEASE ORAL 3 TIMES DAILY PRN
Qty: 20 TABLET | Refills: 2 | Status: SHIPPED | OUTPATIENT
Start: 2023-05-21 | End: 2023-07-19

## 2023-05-21 RX ORDER — NAPROXEN 250 MG/1
500 TABLET ORAL
Status: DISCONTINUED | OUTPATIENT
Start: 2023-05-21 | End: 2023-05-21 | Stop reason: HOSPADM

## 2023-05-21 NOTE — ED PROVIDER NOTES
"Encounter Date: 5/21/2023       History     Chief Complaint   Patient presents with    Back Pain     RAD DOWN RT LEG, PAIN X AWHILE     Patient is a very poor historian.  She was sitting in RWR room. Her chart states she is here for back pain.  I started to ask questions regarding her back pain.  She was concerned about being out in the hallway.  I took her into a petitioned room in the [facility] there was no one in the hallway.  She requested privacy in her room as she thinks she needs to see a gynecologist according to her.  Patient has waited for a very long time to get into a room as we have been busy with sick patients.  She stated she was willing to wait.  Once she finally got into the room she started to tell me about the fact that she thinks she needs to see a gynecologist.  When asked what made her think she needs gynecologist she states that she thinks her bowel is hanging out of her.  I asked if she thought the bowel was hang out of her vagina or out of her rectum she did not understand the word rectum.  We finally narrowed it down that she thought the the bowel was hanging out the rectum.  She says that this is what is hurting her back.  She reports low back pain and states that she has been lifting "a lot of heavy things".  Have asked her she is taken any medication for her back pain she denies taking any medication for her back pain.  I have asked if she is had hemorrhoids in the past and she has denied hemorrhoids.    Review of patient's allergies indicates:   Allergen Reactions    Hydrocodone Itching    Benadryl [diphenhydramine hcl] Other (See Comments)     Agitation      Past Medical History:   Diagnosis Date    Anemia     Blood type, Rh negative      Past Surgical History:   Procedure Laterality Date    HYSTERECTOMY       Family History   Problem Relation Age of Onset    Cancer Mother     No Known Problems Sister     No Known Problems Brother     No Known Problems Daughter     No Known Problems Son  "     Social History     Tobacco Use    Smoking status: Never   Substance Use Topics    Alcohol use: Yes     Comment: socially    Drug use: Not Currently     Review of Systems   Constitutional:  Negative for activity change and fever.   Respiratory:  Negative for cough, shortness of breath and wheezing.    Cardiovascular:  Negative for chest pain, palpitations and leg swelling.   Gastrointestinal:  Negative for abdominal pain, constipation, diarrhea, nausea and vomiting.   Genitourinary:  Negative for dysuria.   Musculoskeletal:  Positive for back pain. Negative for neck pain.   Skin:  Negative for rash.   Neurological:  Negative for weakness.     Physical Exam     Initial Vitals [05/21/23 1451]   BP Pulse Resp Temp SpO2   (!) 106/49 72 16 98.7 °F (37.1 °C) 97 %      MAP       --         Physical Exam    Constitutional: She appears well-developed and well-nourished.   HENT:   Head: Normocephalic.   Mouth/Throat: Oropharynx is clear and moist.   Eyes: Conjunctivae are normal.   Neck: Neck supple.   Normal range of motion.  Cardiovascular:  Normal rate and regular rhythm.           Pulmonary/Chest: Breath sounds normal. No respiratory distress.   Abdominal: Abdomen is soft. Bowel sounds are normal. She exhibits no distension. There is no abdominal tenderness.   Genitourinary:    Genitourinary Comments: Patient has cluster of multiple external hemorrhoids.  I was accompanied by Evelina MARTÍNEZ during exam.     Musculoskeletal:         General: Normal range of motion.      Cervical back: Normal range of motion and neck supple.      Comments: Straight leg test negative.  Patient's gait is steady.  There is no bony tenderness.  There is no muscular strain palpable.  Patient is able to move in all directions.     Neurological: She is alert and oriented to person, place, and time. No sensory deficit. GCS score is 15. GCS eye subscore is 4. GCS verbal subscore is 5. GCS motor subscore is 6.   Skin: Skin is warm and dry. Capillary  refill takes less than 2 seconds.   Psychiatric: She has a normal mood and affect. Thought content normal.       ED Course   Procedures  Labs Reviewed   URINALYSIS, REFLEX TO URINE CULTURE - Abnormal; Notable for the following components:       Result Value    Specific Gravity, UA >1.030 (*)     Protein, UA Trace (*)     Leukocytes, UA Trace (*)     All other components within normal limits    Narrative:     Specimen Source->Urine   URINALYSIS MICROSCOPIC - Abnormal; Notable for the following components:    Hyaline Casts, UA 3 (*)     All other components within normal limits    Narrative:     Specimen Source->Urine          Imaging Results              X-Ray Lumbar Spine 5 View (Final result)  Result time 05/21/23 19:25:12   Procedure changed from X-Ray Lumbar Spine Ap And Lateral     Final result by Rk Milligan MD (05/21/23 19:25:12)                   Narrative:    XR LUMBAR SPINE 4 OR MORE VIEWS    CLINICAL STATEMENT: pain    COMPARISON:  None    FINDINGS: Vertebral body heights are intact. Alignment is intact. No subluxation. Pedicles are intact.    IMPRESSION:    No fracture.    Electronically signed by:  Rk Mililgan MD  5/21/2023 7:25 PM CDT Workstation: [id number]                                     X-Ray Abdomen AP 1 View (KUB) (Final result)  Result time 05/21/23 17:49:04      Final result by Yulia Pena Jr., MD (05/21/23 17:49:04)                   Narrative:    EXAMINATION:  XR ABDOMEN AP 1 VIEW    CLINICAL INDICATION:  Female, 53 years old. Constipation.    COMPARISON:  None.    FINDINGS:  There are scattered loops of nondistended colon and small bowel. I do not see a great deal of fecal material in the colon.    No other abnormality can be seen.    IMPRESSION:  There are no specific diagnostic findings.    Electronically signed by:  Yulia Pena Jr., MD  5/21/2023 5:49 PM CDT Workstation: [id number]                                     Medications   naproxen tablet 500 mg (500 mg Oral Not  Given 5/21/23 1815)     Medical Decision Making:   Initial Assessment:   Presents with complaint of feeling as if her bowel is coming out of her rectum.  Patient also reports low back pain.  States she is been lifting heavy objects the past couple of weeks.  She reports her low back pain has been going on for over a year but that it got worse last night.  She denies pain down legs.  Has taken no medications for her pain not even over-the-counter medications  Clinical Tests:   Radiological Study: Reviewed  ED Management:  Patient has been told that she has hemorrhoids.  I had the nurse get a tucks pad from labor and delivery.                         Clinical Impression:   Final diagnoses:  [K59.00] Constipation  [M54.9, G89.29] Chronic back pain, unspecified back location, unspecified back pain laterality (Primary)  [K64.9] Hemorrhoids, unspecified hemorrhoid type        ED Disposition Condition    Discharge Stable          ED Prescriptions       Medication Sig Dispense Start Date End Date Auth. Provider    diclofenac (VOLTAREN) 50 MG EC tablet Take 1 tablet (50 mg total) by mouth 3 (three) times daily as needed. 20 tablet 5/21/2023 -- Keeley Price NP    methocarbamoL (ROBAXIN) 500 MG Tab Take 1 tablet (500 mg total) by mouth 3 (three) times daily. for 7 days 21 tablet 5/21/2023 5/28/2023 Keeley Price NP          Follow-up Information       Follow up With Specialties Details Why Contact Info    Savana Acevedo MD Family Medicine In 3 days  [address] [facility] [address]  [phone]      Mack Calix Jr., MD General Surgery In 3 days  [address]  [address]  [place] [address]  [phone]      Alondra Noguera MD Gastroenterology In 3 days  [address]  [place] [address]  [phone]               Keeley Price NP  05/21/23 1950

## 2023-05-21 NOTE — ED NOTES
"ALERT AND ORIENTED. STATES LAST BM THIS AM AND HAS BACK PAIN. MASK IN PLACE.  PRIVATE ROOM. EVEN AND NON LABORED RESPIRATIONS.  AIRWAY CLEAR.  PULSES REGULAR.  < 3" CAPILLARY REFILL. SKIN WDI.  MAEW.  NON DISTENDED ABDOMEN. ALERT, ORIENTED AND AMBULATORY. NAD AT THIS TIME.  CALL LIGHT IN REACH.   "

## 2023-05-22 NOTE — DISCHARGE INSTRUCTIONS
Use a Tux pads we have given you for your hemorrhoids.  As you have had some relief from the hemorrhoid pain using the tucks pad here.  Follow up with a general surgeon I have given you the name of one.  I have also given you the name of a GI doctor per your request.

## 2023-05-26 ENCOUNTER — HOSPITAL ENCOUNTER (EMERGENCY)
Facility: HOSPITAL | Age: 53
Discharge: HOME | End: 2023-05-26
Attending: EMERGENCY MEDICINE
Payer: COMMERCIAL

## 2023-05-26 VITALS
HEART RATE: 61 BPM | WEIGHT: 119 LBS | HEIGHT: 65 IN | RESPIRATION RATE: 18 BRPM | TEMPERATURE: 99 F | SYSTOLIC BLOOD PRESSURE: 97 MMHG | DIASTOLIC BLOOD PRESSURE: 50 MMHG | BODY MASS INDEX: 19.83 KG/M2 | OXYGEN SATURATION: 98 %
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
## 2023-05-26 DIAGNOSIS — M79.673 FOOT PAIN: ICD-10-CM

## 2023-05-26 DIAGNOSIS — S90.30XA CONTUSION OF DORSUM OF FOOT: ICD-10-CM

## 2023-05-26 PROCEDURE — 99283 EMERGENCY DEPT VISIT LOW MDM: CPT

## 2023-05-26 PROCEDURE — 25000003 PHARM REV CODE 250: Performed by: EMERGENCY MEDICINE

## 2023-05-26 RX ORDER — IBUPROFEN 600 MG/1
600 TABLET ORAL EVERY 6 HOURS PRN
Qty: 20 TABLET | Refills: 0 | Status: SHIPPED | OUTPATIENT
Start: 2023-05-26 | End: 2023-07-18 | Stop reason: SDUPTHER

## 2023-05-26 RX ORDER — IBUPROFEN 600 MG/1
600 TABLET ORAL
Status: COMPLETED | OUTPATIENT
Start: 2023-05-26 | End: 2023-05-26

## 2023-05-26 RX ADMIN — IBUPROFEN 600 MG: 600 TABLET ORAL at 01:05

## 2023-05-26 NOTE — ED PROVIDER NOTES
Encounter Date: 5/26/2023    SCRIBE #1 NOTE: I, Clarisasis Ohara, am scribing for, and in the presence of,  Manuel Jon MD.     History     Chief Complaint   Patient presents with    Animal Bite     Pt states that a dog bit her R foot, no abrasion or deformity noted.      Time seen by provider: 1:08 PM on 05/26/2023    Rehana Loving is a 53 y.o. female who presents to the ED with an onset of a dog bite to the right foot that occurred pta. The bite was over a leather shoe and did not break the skin. She has right foot pain on the lateral side and a bruise. The patient denies bleeding or any other symptoms at this time. She has a PMHx of anemia. She has no pertinent PSHx.    The history is provided by the patient.   Review of patient's allergies indicates:   Allergen Reactions    Hydrocodone Itching    Benadryl [diphenhydramine hcl] Other (See Comments)     Agitation      Past Medical History:   Diagnosis Date    Anemia     Blood type, Rh negative      Past Surgical History:   Procedure Laterality Date    HYSTERECTOMY       Family History   Problem Relation Age of Onset    Cancer Mother     No Known Problems Sister     No Known Problems Brother     No Known Problems Daughter     No Known Problems Son      Social History     Tobacco Use    Smoking status: Never   Substance Use Topics    Alcohol use: Yes     Comment: socially    Drug use: Not Currently     Review of Systems   Constitutional:  Negative for fever.   Respiratory:  Negative for shortness of breath.    Genitourinary:  Negative for flank pain.   Musculoskeletal:  Negative for gait problem.        Positive for right foot pain.   Skin:  Positive for color change (bruise). Negative for wound.        Negative for bleeding.   Neurological:  Negative for weakness.   Psychiatric/Behavioral:  Negative for confusion.      Physical Exam     Initial Vitals [05/26/23 1302]   BP Pulse Resp Temp SpO2   (!) 124/50 61 18 98.7 °F (37.1 °C) 98 %      MAP       --          Physical Exam    Nursing note and vitals reviewed.  Constitutional: She appears well-developed and well-nourished.   HENT:   Head: Normocephalic and atraumatic.   Eyes: Conjunctivae are normal.   Neck: Neck supple.   Normal range of motion.  Cardiovascular:  Normal rate, regular rhythm and normal heart sounds.     Exam reveals no gallop and no friction rub.       No murmur heard.  Pulmonary/Chest: Effort normal and breath sounds normal. No respiratory distress. She has no wheezes. She has no rhonchi. She has no rales.   Abdominal: Abdomen is soft. She exhibits no distension. There is no abdominal tenderness.   Musculoskeletal:         General: Normal range of motion.      Cervical back: Normal range of motion and neck supple.      Comments: Ecchymosis, tenderness, and mild swelling to the right dorsolateral foot. Skin intact.     Neurological: She is alert and oriented to person, place, and time.   Skin: Skin is warm and dry. No erythema.   Psychiatric: She has a normal mood and affect.       ED Course   Procedures  Labs Reviewed - No data to display       Imaging Results    None          Medications   ibuprofen tablet 600 mg (600 mg Oral Given 5/26/23 1340)     Medical Decision Making:   History:   Old Medical Records: I decided to obtain old medical records.  Independently Interpreted Test(s):   I have ordered and independently interpreted X-rays - see prior notes.  Clinical Tests:   Radiological Study: Ordered and Reviewed  ED Management:  53-year-old female presents with right dorsal foot pain after a dog bite.  There is no puncture wound.  She does have ecchymosis with swelling and tenderness consistent with a contusion.  She refused x-rays.       APC / Resident Notes:   I, Dr. Manuel Jon III, personally performed the services described in this documentation. All medical record entries made by the scribe were at my direction and in my presence.  I have reviewed the chart and agree that the record  reflects my personal performance and is accurate and complete   Scribe Attestation:   Scribe #1: I performed the above scribed service and the documentation accurately describes the services I performed. I attest to the accuracy of the note.                   Clinical Impression:   Final diagnoses:  [W54.0XXA] Dog bite, initial encounter (Primary)  [S90.30XA] Contusion of dorsum of foot  [M79.673] Foot pain        ED Disposition Condition    Discharge Stable          ED Prescriptions       Medication Sig Dispense Start Date End Date Auth. Provider    ibuprofen (ADVIL,MOTRIN) 600 MG tablet Take 1 tablet (600 mg total) by mouth every 6 (six) hours as needed for Pain. 20 tablet 5/26/2023 -- Manuel Jon III, MD          Follow-up Information       Follow up With Specialties Details Why Contact Info    Savana Acevedo MD Family Medicine In 1 week  [address] [facility] [address]  [phone]               aMnuel Jon III, MD  05/26/23 0967

## 2023-06-15 RX ORDER — FERROUS SULFATE 324(65)MG
TABLET, DELAYED RELEASE (ENTERIC COATED) ORAL
Qty: 30 TABLET | Refills: 1 | Status: SHIPPED | OUTPATIENT
Start: 2023-06-15 | End: 2023-09-12 | Stop reason: SDUPTHER

## 2023-07-06 ENCOUNTER — TELEPHONE (OUTPATIENT)
Dept: FAMILY MEDICINE | Facility: CLINIC | Age: 53
End: 2023-07-06
Payer: COMMERCIAL

## 2023-07-06 NOTE — TELEPHONE ENCOUNTER
----- Message from Unique Olivia sent at 7/6/2023 10:34 AM CDT -----  Contact: PT  Type:  NP  Apoointment Request    Caller is requesting a sooner appointment.  Caller declined first available appointment listed below.  Caller will not accept being placed on the waitlist and is requesting a message be sent to doctor.  Name of Caller OT   When is the first available appointment?07/20/23  Symptoms:RX REFILL   Would the patient rather a call back or a response via [facility]? CALL   Best Call Back Number:[phone] (home)     Additional Information: THANK YOU

## 2023-07-18 ENCOUNTER — OFFICE VISIT (OUTPATIENT)
Dept: PRIMARY CARE CLINIC | Facility: CLINIC | Age: 53
End: 2023-07-18
Payer: COMMERCIAL

## 2023-07-18 DIAGNOSIS — R41.840 IMPAIRED CONCENTRATION: ICD-10-CM

## 2023-07-18 DIAGNOSIS — M25.551 CHRONIC RIGHT HIP PAIN: Primary | ICD-10-CM

## 2023-07-18 DIAGNOSIS — F33.9 DEPRESSION, RECURRENT: ICD-10-CM

## 2023-07-18 DIAGNOSIS — G89.29 CHRONIC RIGHT HIP PAIN: Primary | ICD-10-CM

## 2023-07-18 DIAGNOSIS — F41.9 ANXIETY: ICD-10-CM

## 2023-07-18 PROCEDURE — 99214 PR OFFICE/OUTPT VISIT, EST, LEVL IV, 30-39 MIN: ICD-10-PCS | Mod: 95,,, | Performed by: STUDENT IN AN ORGANIZED HEALTH CARE EDUCATION/TRAINING PROGRAM

## 2023-07-18 PROCEDURE — 99214 OFFICE O/P EST MOD 30 MIN: CPT | Mod: 95,,, | Performed by: STUDENT IN AN ORGANIZED HEALTH CARE EDUCATION/TRAINING PROGRAM

## 2023-07-18 PROCEDURE — 3044F PR MOST RECENT HEMOGLOBIN A1C LEVEL <7.0%: ICD-10-PCS | Mod: CPTII,95,, | Performed by: STUDENT IN AN ORGANIZED HEALTH CARE EDUCATION/TRAINING PROGRAM

## 2023-07-18 PROCEDURE — 3044F HG A1C LEVEL LT 7.0%: CPT | Mod: CPTII,95,, | Performed by: STUDENT IN AN ORGANIZED HEALTH CARE EDUCATION/TRAINING PROGRAM

## 2023-07-18 RX ORDER — OXYCODONE AND ACETAMINOPHEN 5; 325 MG/1; MG/1
1 TABLET ORAL EVERY 12 HOURS PRN
Qty: 12 TABLET | Refills: 0 | Status: SHIPPED | OUTPATIENT
Start: 2023-07-18 | End: 2023-08-16 | Stop reason: ALTCHOICE

## 2023-07-18 RX ORDER — ATOMOXETINE 40 MG/1
40 CAPSULE ORAL DAILY
Qty: 30 CAPSULE | Refills: 0 | Status: SHIPPED | OUTPATIENT
Start: 2023-07-18 | End: 2023-08-16 | Stop reason: ALTCHOICE

## 2023-07-18 RX ORDER — MIRTAZAPINE 7.5 MG/1
7.5 TABLET, FILM COATED ORAL NIGHTLY
Qty: 90 TABLET | Refills: 1 | Status: SHIPPED | OUTPATIENT
Start: 2023-07-18 | End: 2023-09-12 | Stop reason: SDUPTHER

## 2023-07-18 RX ORDER — IBUPROFEN 600 MG/1
600 TABLET ORAL EVERY 6 HOURS PRN
Qty: 30 TABLET | Refills: 1 | Status: SHIPPED | OUTPATIENT
Start: 2023-07-18

## 2023-07-18 NOTE — PROGRESS NOTES
07/19/2023    Rehana England  [id number]    CC: follow up    HPI    This pt is known to me and presents virtually for chronic conditions. Since last OV was seen in the ED twice in May for dog bite and back pain. PMH sig for mood disorder.    Right hip pain from old injury hurt when getting up and popping. Hx of PT but never finished; has been doing exercises . Pain never goes away, but has been worse recently.   Hx of right joint injection, but sure exactly.  No limitations in ADLs  Has taken ibuprofen 600 mg w/o relief. No pain down the leg     Mood disorder  Mirtazapine 7.5 mg  has helped pt be less irritable, but not sure that it's really working  Recently let go during orientation from new job for being disengaged and trouble with focus. Pt states that she has never had a concentration problem and does not perceive that to be an issue, but now is concerned. Notes that mirtazapine may linger into the next morning and at times her speech is slurred.    Negative 10 point ROS outside of HPI    Social History     Socioeconomic History    Marital status: Single   Tobacco Use    Smoking status: Never   Substance and Sexual Activity    Alcohol use: Yes     Comment: socially    Drug use: Not Currently    Sexual activity: Yes     Partners: Male           Current Outpatient Medications:     atomoxetine (STRATTERA) 40 MG capsule, Take 1 capsule (40 mg total) by mouth once daily., Disp: 30 capsule, Rfl: 0    ferrous sulfate 324 mg (65 mg iron) TbEC, TAKE 1 TABLET BY MOUTH DAILY, Disp: 30 tablet, Rfl: 1    ibuprofen (ADVIL,MOTRIN) 600 MG tablet, Take 1 tablet (600 mg total) by mouth every 6 (six) hours as needed for Pain., Disp: 30 tablet, Rfl: 1    mirtazapine (REMERON) 7.5 MG Tab, Take 1 tablet (7.5 mg total) by mouth every evening., Disp: 90 tablet, Rfl: 1    oxyCODONE-acetaminophen (PERCOCET) 5-325 mg per tablet, Take 1 tablet by mouth every 12 (twelve) hours as needed for Pain., Disp: 12 tablet, Rfl: 0      Physical  Exam  Vitals unable to obtain    Gen: well appearing  Resp: speaks in full sentences. Non labored breathing  Cv: non-diaphoretic    1. Chronic right hip pain  - oxyCODONE-acetaminophen (PERCOCET) 5-325 mg per tablet; Take 1 tablet by mouth every 12 (twelve) hours as needed for Pain.  Dispense: 12 tablet; Refill: 0   No refills  - ibuprofen (ADVIL,MOTRIN) 600 MG tablet; Take 1 tablet (600 mg total) by mouth every 6 (six) hours as needed for Pain.  Dispense: 30 tablet; Refill: 1    2. Impaired concentration  - START atomoxetine (STRATTERA) 40 MG capsule; Take 1 capsule (40 mg total) by mouth once daily.  Dispense: 30 capsule; Refill: 0  Follow up Aug 18    3. Depression, recurrent  Stable Refilled mirtazapine     RTC as previously scheduled     Savana Acevedo MD  Family Medicine

## 2023-07-19 ENCOUNTER — PATIENT MESSAGE (OUTPATIENT)
Dept: PRIMARY CARE CLINIC | Facility: CLINIC | Age: 53
End: 2023-07-19
Payer: COMMERCIAL

## 2023-07-23 ENCOUNTER — PATIENT MESSAGE (OUTPATIENT)
Dept: PRIMARY CARE CLINIC | Facility: CLINIC | Age: 53
End: 2023-07-23
Payer: COMMERCIAL

## 2023-07-31 ENCOUNTER — PATIENT MESSAGE (OUTPATIENT)
Dept: PRIMARY CARE CLINIC | Facility: CLINIC | Age: 53
End: 2023-07-31
Payer: COMMERCIAL

## 2023-08-02 ENCOUNTER — PATIENT MESSAGE (OUTPATIENT)
Dept: PRIMARY CARE CLINIC | Facility: CLINIC | Age: 53
End: 2023-08-02
Payer: COMMERCIAL

## 2023-08-03 DIAGNOSIS — M25.551 RIGHT HIP PAIN: Primary | ICD-10-CM

## 2023-08-03 NOTE — TELEPHONE ENCOUNTER
Please let pt know that I have placed an urgent referral to [facility] for her hip pain, but I cannot refill opioid pain medication.

## 2023-08-15 ENCOUNTER — PATIENT MESSAGE (OUTPATIENT)
Dept: PRIMARY CARE CLINIC | Facility: CLINIC | Age: 53
End: 2023-08-15
Payer: COMMERCIAL

## 2023-08-16 ENCOUNTER — OFFICE VISIT (OUTPATIENT)
Dept: PRIMARY CARE CLINIC | Facility: CLINIC | Age: 53
End: 2023-08-16
Payer: COMMERCIAL

## 2023-08-16 DIAGNOSIS — R41.840 IMPAIRED CONCENTRATION: ICD-10-CM

## 2023-08-16 DIAGNOSIS — F90.9 ADULT ADHD: ICD-10-CM

## 2023-08-16 DIAGNOSIS — M25.551 CHRONIC RIGHT HIP PAIN: ICD-10-CM

## 2023-08-16 DIAGNOSIS — G89.29 CHRONIC RIGHT HIP PAIN: ICD-10-CM

## 2023-08-16 DIAGNOSIS — M25.551 CHRONIC RIGHT HIP PAIN: Primary | ICD-10-CM

## 2023-08-16 DIAGNOSIS — G89.29 CHRONIC RIGHT HIP PAIN: Primary | ICD-10-CM

## 2023-08-16 PROCEDURE — 3044F PR MOST RECENT HEMOGLOBIN A1C LEVEL <7.0%: ICD-10-PCS | Mod: CPTII,95,, | Performed by: STUDENT IN AN ORGANIZED HEALTH CARE EDUCATION/TRAINING PROGRAM

## 2023-08-16 PROCEDURE — 99214 OFFICE O/P EST MOD 30 MIN: CPT | Mod: 95,,, | Performed by: STUDENT IN AN ORGANIZED HEALTH CARE EDUCATION/TRAINING PROGRAM

## 2023-08-16 PROCEDURE — 99214 PR OFFICE/OUTPT VISIT, EST, LEVL IV, 30-39 MIN: ICD-10-PCS | Mod: 95,,, | Performed by: STUDENT IN AN ORGANIZED HEALTH CARE EDUCATION/TRAINING PROGRAM

## 2023-08-16 PROCEDURE — 3044F HG A1C LEVEL LT 7.0%: CPT | Mod: CPTII,95,, | Performed by: STUDENT IN AN ORGANIZED HEALTH CARE EDUCATION/TRAINING PROGRAM

## 2023-08-16 RX ORDER — LISDEXAMFETAMINE DIMESYLATE 10 MG/1
1 CAPSULE ORAL DAILY
Qty: 30 CAPSULE | Refills: 0 | Status: SHIPPED | OUTPATIENT
Start: 2023-08-16 | End: 2023-09-12 | Stop reason: SDUPTHER

## 2023-08-16 RX ORDER — HYDROCODONE BITARTRATE AND ACETAMINOPHEN 5; 325 MG/1; MG/1
1 TABLET ORAL EVERY 12 HOURS PRN
Qty: 35 TABLET | Refills: 0 | Status: SHIPPED | OUTPATIENT
Start: 2023-08-16

## 2023-08-16 NOTE — TELEPHONE ENCOUNTER
No care due was identified.  [facility] Embedded Care Due Messages. Reference number: [id number].   8/16/2023 8:02:42 AM CDT

## 2023-08-16 NOTE — PROGRESS NOTES
08/16/2023    Rehana England  [id number]    CC:     Visit type: audiovisual   Location: [place]     Face to Face time with patient: 25  total minutes of total time spent on the encounter, which includes face to face time and non-face to face time preparing to see the patient (eg, review of tests), Obtaining and/or reviewing separately obtained history, Documenting clinical information in the electronic or other health record, Independently interpreting results (not separately reported) and communicating results to the patient/family/caregiver, or Care coordination (not separately reported).      Each patient to whom he or she provides medical services by telemedicine is:  (1) informed of the relationship between the physician and patient and the respective role of any other health care provider with respect to management of the patient; and (2) notified that he or she may decline to receive medical services by telemedicine and may withdraw from such care at any time.    HPI  This pt is well known to me and presents virtually for follow up of hip pain and concentration.    Hip pain  Patient states that she does not plan to establish with Ortho b/c the the injections and physical therapy didn't help before. She would like refill of pain medication, but not what she had previously b/c it didn't work that long.    Concentration deficit  Hx of aderral for a few months after divorce   Straterra increased intake; didn't noticed any positive impact; disrupted sleep    Negative 10 point ROS outside of HPI    Social History     Socioeconomic History    Marital status: Single   Tobacco Use    Smoking status: Never   Substance and Sexual Activity    Alcohol use: Yes     Comment: socially    Drug use: Not Currently    Sexual activity: Yes     Partners: Male     Social Determinants of Health     Financial Resource Strain: Unknown (5/14/2021)    Overall Financial Resource Strain (CARDIA)     Difficulty of Paying Living  Expenses: Patient refused   Food Insecurity: No Food Insecurity (5/14/2021)    Hunger Vital Sign     Worried About Running Out of Food in the Last Year: Never true     Ran Out of Food in the Last Year: Never true   Transportation Needs: No Transportation Needs (5/14/2021)    PRAPARE - Transportation     Lack of Transportation (Medical): No     Lack of Transportation (Non-Medical): No   Physical Activity: Inactive (5/14/2021)    Exercise Vital Sign     Days of Exercise per Week: 0 days     Minutes of Exercise per Session: 0 min   Housing Stability: Unknown (5/14/2021)    Housing Stability Vital Sign     Unable to Pay for Housing in the Last Year: No     Unstable Housing in the Last Year: No           Current Outpatient Medications:     atomoxetine (STRATTERA) 40 MG capsule, Take 1 capsule (40 mg total) by mouth once daily., Disp: 30 capsule, Rfl: 0    ferrous sulfate 324 mg (65 mg iron) TbEC, TAKE 1 TABLET BY MOUTH DAILY, Disp: 30 tablet, Rfl: 1    ibuprofen (ADVIL,MOTRIN) 600 MG tablet, Take 1 tablet (600 mg total) by mouth every 6 (six) hours as needed for Pain., Disp: 30 tablet, Rfl: 1    mirtazapine (REMERON) 7.5 MG Tab, Take 1 tablet (7.5 mg total) by mouth every evening., Disp: 90 tablet, Rfl: 1    oxyCODONE-acetaminophen (PERCOCET) 5-325 mg per tablet, Take 1 tablet by mouth every 12 (twelve) hours as needed for Pain., Disp: 12 tablet, Rfl: 0      Physical Exam  Vitals unable to obtain    Gen: well appearing  Resp: speaks in full sentences. Non labored breathing  Cv: non-diaphoretic    Problem List Items Addressed This Visit    None  Visit Diagnoses       Chronic right hip pain    -  Primary    Discussed no plan for continuing on chronic opioid treatment    Relevant Medications    HYDROcodone-acetaminophen (NORCO) 5-325 mg per tablet    Other Relevant Orders    Ambulatory referral/consult to Pain Clinic    Adult ADHD        D/c straterra    Relevant Medications    lisdexamfetamine (VYVANSE) 10 mg Cap    Other  Relevant Orders    Ambulatory referral/consult to Psychiatry            RTC 4 weeks for medication follow up    Savana Acevedo MD  Family Medicine

## 2023-08-16 NOTE — TELEPHONE ENCOUNTER
No care due was identified.  [facility] Embedded Care Due Messages. Reference number: [id number].   8/16/2023 9:51:52 AM CDT

## 2023-08-17 ENCOUNTER — PATIENT MESSAGE (OUTPATIENT)
Dept: PRIMARY CARE CLINIC | Facility: CLINIC | Age: 53
End: 2023-08-17
Payer: COMMERCIAL

## 2023-08-17 RX ORDER — ATOMOXETINE 40 MG/1
40 CAPSULE ORAL DAILY
Qty: 30 CAPSULE | Refills: 0 | OUTPATIENT
Start: 2023-08-17 | End: 2023-09-16

## 2023-08-17 RX ORDER — LISDEXAMFETAMINE DIMESYLATE 10 MG/1
1 CAPSULE ORAL DAILY
Qty: 30 CAPSULE | Refills: 0 | Status: CANCELLED | OUTPATIENT
Start: 2023-08-17

## 2023-08-17 NOTE — TELEPHONE ENCOUNTER
----- Message from Demi Gifford sent at 8/17/2023 10:02 AM CDT -----  Contact: [phone]  Requesting an RX refill or new RX.  Is this a refill or new RX: refill  RX name and strength (copy/paste from chart):  lisdexamfetamine (VYVANSE) 10 mg Cap  Is this a 30 day or 90 day RX: 30  Pharmacy name and phone # (copy/paste from chart):    [id number] - [place], [place] - [address] AT [facility] [address] & [facility]  [address] [facility] [address]  Phone: [phone] Fax: [phone]     The doctors have asked that we provide their patients with the following 2 reminders -- prescription refills can take up to 72 hours, and a friendly reminder that in the future you can use your [facility] account to request refills: yes pt states the [facility] does not have the medication please advise     Requesting an RX refill or new RX.  Is this a refill or new RX: refill  RX name and strength (copy/paste from chart): HYDROcodone-acetaminophen (NORCO) 5-325 mg per tablet   Is this a 30 day or 90 day RX: 30  Pharmacy name and phone # (copy/paste from chart):    [id number] - [place], [place] - [address] AT [facility] & [facility]  [address] [facility] [address]  Phone: [phone] Fax: [phone]     The doctors have asked that we provide their patients with the following 2 reminders -- prescription refills can take up to 72 hours, and a friendly reminder that in the future you can use your [facility] account to request refills: yes [facility] also does not have this in stock either

## 2023-08-18 ENCOUNTER — PATIENT MESSAGE (OUTPATIENT)
Dept: PRIMARY CARE CLINIC | Facility: CLINIC | Age: 53
End: 2023-08-18
Payer: COMMERCIAL

## 2023-08-21 DIAGNOSIS — F90.9 ADULT ADHD: ICD-10-CM

## 2023-08-21 NOTE — TELEPHONE ENCOUNTER
No care due was identified.  [facility] Embedded Care Due Messages. Reference number: [id number].   8/21/2023 8:51:09 AM CDT

## 2023-08-21 NOTE — TELEPHONE ENCOUNTER
----- Message from Mickie Alexia sent at 8/19/2023  9:47 AM CDT -----  Contact: [phone] Patient  Pt states she sent several Pt portal messages and the [facility]     Requesting an RX refill or new RX.  Is this a refill or new RX: new  RX name and strength (copy/paste from chart):  lisdexamfetamine (VYVANSE) 10 mg Cap  Is this a 30 day or 90 day RX:   Pharmacy name and phone # (copy/paste from chart):    [id number] [address] & [address] [address]  Phone: [phone] Fax: [phone]     The doctors have asked that we provide their patients with the following 2 reminders -- prescription refills can take up to 72 hours, and a friendly reminder that in the future you can use your [facility] account to request refills: yes

## 2023-08-22 RX ORDER — OXYCODONE AND ACETAMINOPHEN 5; 325 MG/1; MG/1
1 TABLET ORAL EVERY 12 HOURS PRN
Qty: 12 TABLET | Refills: 0 | OUTPATIENT
Start: 2023-08-22

## 2023-08-22 RX ORDER — HYDROCODONE BITARTRATE AND ACETAMINOPHEN 5; 325 MG/1; MG/1
1 TABLET ORAL EVERY 12 HOURS PRN
Qty: 35 TABLET | Refills: 0 | OUTPATIENT
Start: 2023-08-22

## 2023-08-26 ENCOUNTER — PATIENT MESSAGE (OUTPATIENT)
Dept: ADMINISTRATIVE | Facility: OTHER | Age: 53
End: 2023-08-26
Payer: COMMERCIAL

## 2023-08-29 ENCOUNTER — TELEPHONE (OUTPATIENT)
Dept: PAIN MEDICINE | Facility: CLINIC | Age: 53
End: 2023-08-29
Payer: COMMERCIAL

## 2023-09-12 DIAGNOSIS — F90.9 ADULT ADHD: ICD-10-CM

## 2023-09-12 DIAGNOSIS — M25.551 CHRONIC RIGHT HIP PAIN: ICD-10-CM

## 2023-09-12 DIAGNOSIS — G89.29 CHRONIC RIGHT HIP PAIN: ICD-10-CM

## 2023-09-12 DIAGNOSIS — F41.9 ANXIETY: ICD-10-CM

## 2023-09-13 ENCOUNTER — TELEPHONE (OUTPATIENT)
Dept: PRIMARY CARE CLINIC | Facility: CLINIC | Age: 53
End: 2023-09-13
Payer: COMMERCIAL

## 2023-09-13 RX ORDER — HYDROCODONE BITARTRATE AND ACETAMINOPHEN 5; 325 MG/1; MG/1
1 TABLET ORAL EVERY 12 HOURS PRN
Qty: 35 TABLET | Refills: 0 | OUTPATIENT
Start: 2023-09-13

## 2023-09-13 RX ORDER — MIRTAZAPINE 7.5 MG/1
7.5 TABLET, FILM COATED ORAL NIGHTLY
Qty: 90 TABLET | Refills: 3 | Status: SHIPPED | OUTPATIENT
Start: 2023-09-13 | End: 2024-09-12

## 2023-09-13 RX ORDER — LISDEXAMFETAMINE DIMESYLATE 10 MG/1
1 CAPSULE ORAL DAILY
Qty: 30 CAPSULE | Refills: 0 | OUTPATIENT
Start: 2023-09-13

## 2023-09-13 RX ORDER — LISDEXAMFETAMINE DIMESYLATE CAPSULES 10 MG/1
1 CAPSULE ORAL DAILY
Qty: 30 CAPSULE | Refills: 0 | Status: SHIPPED | OUTPATIENT
Start: 2023-09-13 | End: 2023-10-27 | Stop reason: SDUPTHER

## 2023-09-13 RX ORDER — FERROUS SULFATE 324(65)MG
324 TABLET, DELAYED RELEASE (ENTERIC COATED) ORAL DAILY
Qty: 90 TABLET | Refills: 1 | Status: SHIPPED | OUTPATIENT
Start: 2023-09-13 | End: 2024-03-21 | Stop reason: SDUPTHER

## 2023-09-13 NOTE — TELEPHONE ENCOUNTER
Please let pt know that I am not able to continue her on chronic pain medication, that is why I placed a referral to pain management so that she can be evaluated.

## 2023-09-28 ENCOUNTER — TELEPHONE (OUTPATIENT)
Dept: ENDOSCOPY | Facility: HOSPITAL | Age: 53
End: 2023-09-28
Payer: COMMERCIAL

## 2023-09-28 NOTE — TELEPHONE ENCOUNTER
Patient is asking to cancel her PRE-ADMITcall with nurse patient stated she will call back when she is ready to schedule.

## 2023-10-06 ENCOUNTER — PATIENT OUTREACH (OUTPATIENT)
Dept: PRIMARY CARE CLINIC | Facility: CLINIC | Age: 53
End: 2023-10-06
Payer: COMMERCIAL

## 2023-10-06 NOTE — PROGRESS NOTES
Health Maintenance Due   Topic Date Due    Hepatitis C Screening  Never done    Lipid Panel  Never done    HIV Screening  Never done    TETANUS VACCINE  Never done    Mammogram  Never done    Colorectal Cancer Screening  Never done    Shingles Vaccine (1 of 2) Never done    Influenza Vaccine (1) Never done    COVID-19 Vaccine (3 - 2023-24 season) 09/01/2023      PATIENT REFUSED TO SCHEDULE CG/HM . PATIENT EXPRESSED SHE WILL SCHEDULE THEM ON HER OWN.

## 2023-10-27 ENCOUNTER — PATIENT MESSAGE (OUTPATIENT)
Dept: PRIMARY CARE CLINIC | Facility: CLINIC | Age: 53
End: 2023-10-27
Payer: COMMERCIAL

## 2023-10-27 DIAGNOSIS — F90.9 ADULT ADHD: ICD-10-CM

## 2023-10-27 DIAGNOSIS — G89.29 CHRONIC RIGHT HIP PAIN: ICD-10-CM

## 2023-10-27 DIAGNOSIS — M25.551 CHRONIC RIGHT HIP PAIN: ICD-10-CM

## 2023-10-27 NOTE — TELEPHONE ENCOUNTER
No care due was identified.  [facility] Embedded Care Due Messages. Reference number: [id number].   10/27/2023 9:29:26 AM CDT

## 2023-10-30 ENCOUNTER — TELEPHONE (OUTPATIENT)
Dept: PRIMARY CARE CLINIC | Facility: CLINIC | Age: 53
End: 2023-10-30
Payer: COMMERCIAL

## 2023-10-30 DIAGNOSIS — G89.29 CHRONIC RIGHT HIP PAIN: ICD-10-CM

## 2023-10-30 DIAGNOSIS — M25.551 CHRONIC RIGHT HIP PAIN: ICD-10-CM

## 2023-10-30 RX ORDER — LISDEXAMFETAMINE DIMESYLATE CAPSULES 10 MG/1
10 CAPSULE ORAL DAILY
Qty: 30 CAPSULE | Refills: 0 | Status: SHIPPED | OUTPATIENT
Start: 2023-10-30

## 2023-10-30 RX ORDER — HYDROCODONE BITARTRATE AND ACETAMINOPHEN 5; 325 MG/1; MG/1
1 TABLET ORAL EVERY 12 HOURS PRN
Qty: 35 TABLET | Refills: 0 | OUTPATIENT
Start: 2023-10-30

## 2023-10-30 NOTE — TELEPHONE ENCOUNTER
Care Due:                  Date            Visit Type   Department     Provider  --------------------------------------------------------------------------------                                ESTABLISHED                              PATIENT -    [facility] PRIMARY  Last Visit: 08-      [facility]      CARE           Savana Acevedo  Next Visit: None Scheduled  None         None Found                                                            Last  Test          Frequency    Reason                     Performed    Due Date  --------------------------------------------------------------------------------    CBC.........  12 months..  ibuprofen, mirtazapine...  02- 01-    CMP.........  12 months..  ibuprofen, mirtazapine...  02- 01-    Health [facility] Embedded Care Due Messages. Reference number: [id number].   10/30/2023 4:41:01 PM CDT

## 2023-10-30 NOTE — TELEPHONE ENCOUNTER
Please let pt know that I am not able to refill her Norco. I will not be starting her on chronic opioids as it is not appropriate.  I referred her to Ortho and pain management so that she could be evaluated, but I don't see any notes or appointments scheduled with them.

## 2023-10-31 ENCOUNTER — E-VISIT (OUTPATIENT)
Dept: PRIMARY CARE CLINIC | Facility: CLINIC | Age: 53
End: 2023-10-31
Payer: COMMERCIAL

## 2023-10-31 ENCOUNTER — PATIENT MESSAGE (OUTPATIENT)
Dept: ADMINISTRATIVE | Facility: HOSPITAL | Age: 53
End: 2023-10-31
Payer: COMMERCIAL

## 2023-10-31 DIAGNOSIS — M25.551 CHRONIC RIGHT HIP PAIN: Primary | ICD-10-CM

## 2023-10-31 DIAGNOSIS — G89.29 CHRONIC RIGHT HIP PAIN: Primary | ICD-10-CM

## 2023-10-31 PROCEDURE — 99499 UNLISTED E&M SERVICE: CPT | Mod: 95,,, | Performed by: STUDENT IN AN ORGANIZED HEALTH CARE EDUCATION/TRAINING PROGRAM

## 2023-10-31 PROCEDURE — 99499 NO LOS: ICD-10-PCS | Mod: 95,,, | Performed by: STUDENT IN AN ORGANIZED HEALTH CARE EDUCATION/TRAINING PROGRAM

## 2023-10-31 RX ORDER — HYDROCODONE BITARTRATE AND ACETAMINOPHEN 5; 325 MG/1; MG/1
1 TABLET ORAL EVERY 12 HOURS PRN
Qty: 35 TABLET | Refills: 0 | OUTPATIENT
Start: 2023-10-31

## 2023-10-31 NOTE — TELEPHONE ENCOUNTER
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

## 2023-10-31 NOTE — TELEPHONE ENCOUNTER
Please let pt know that I will not be refilling Norco and do not plan plan to start on her on chronic opioids. Last visit she was referred to pain management for further evaluation, and I do not see where she established with them. I recommend  that she establish with them for an appropriate treatment of her pain.

## 2023-11-06 ENCOUNTER — PATIENT MESSAGE (OUTPATIENT)
Dept: PRIMARY CARE CLINIC | Facility: CLINIC | Age: 53
End: 2023-11-06
Payer: COMMERCIAL

## 2023-11-27 ENCOUNTER — PATIENT OUTREACH (OUTPATIENT)
Dept: ADMINISTRATIVE | Facility: HOSPITAL | Age: 53
End: 2023-11-27
Payer: COMMERCIAL

## 2023-11-27 DIAGNOSIS — Z12.11 ENCOUNTER FOR COLORECTAL CANCER SCREENING: Primary | ICD-10-CM

## 2023-11-27 DIAGNOSIS — Z12.12 ENCOUNTER FOR COLORECTAL CANCER SCREENING: Primary | ICD-10-CM

## 2023-11-27 DIAGNOSIS — Z11.59 ENCOUNTER FOR SCREENING FOR OTHER VIRAL DISEASES: ICD-10-CM

## 2023-11-27 DIAGNOSIS — Z13.220 SCREENING FOR CHOLESTEROL LEVEL: ICD-10-CM

## 2023-11-28 ENCOUNTER — PATIENT OUTREACH (OUTPATIENT)
Dept: ADMINISTRATIVE | Facility: HOSPITAL | Age: 53
End: 2023-11-28
Payer: COMMERCIAL

## 2023-12-07 ENCOUNTER — CLINICAL SUPPORT (OUTPATIENT)
Dept: ENDOSCOPY | Facility: HOSPITAL | Age: 53
End: 2023-12-07
Attending: STUDENT IN AN ORGANIZED HEALTH CARE EDUCATION/TRAINING PROGRAM
Payer: COMMERCIAL

## 2023-12-07 ENCOUNTER — TELEPHONE (OUTPATIENT)
Dept: ENDOSCOPY | Facility: HOSPITAL | Age: 53
End: 2023-12-07

## 2023-12-07 DIAGNOSIS — Z12.11 ENCOUNTER FOR COLORECTAL CANCER SCREENING: ICD-10-CM

## 2023-12-07 DIAGNOSIS — Z12.12 ENCOUNTER FOR COLORECTAL CANCER SCREENING: ICD-10-CM

## 2023-12-07 NOTE — PLAN OF CARE
Telephoned pt to schedule colonoscopy.  Pt states she does want to schedule colonoscopy at this time. States she has other things going on.  Order cancelled.  Pt to contact PCP and will have her place new request when she is ready to schedule.

## 2023-12-08 ENCOUNTER — PATIENT MESSAGE (OUTPATIENT)
Dept: PRIMARY CARE CLINIC | Facility: CLINIC | Age: 53
End: 2023-12-08
Payer: COMMERCIAL

## 2023-12-08 ENCOUNTER — LAB VISIT (OUTPATIENT)
Dept: LAB | Facility: HOSPITAL | Age: 53
End: 2023-12-08
Attending: STUDENT IN AN ORGANIZED HEALTH CARE EDUCATION/TRAINING PROGRAM
Payer: COMMERCIAL

## 2023-12-08 ENCOUNTER — PATIENT OUTREACH (OUTPATIENT)
Dept: ADMINISTRATIVE | Facility: HOSPITAL | Age: 53
End: 2023-12-08
Payer: COMMERCIAL

## 2023-12-08 DIAGNOSIS — Z00.00 ROUTINE MEDICAL EXAM: ICD-10-CM

## 2023-12-08 DIAGNOSIS — Z13.6 SCREENING FOR HEART DISEASE: ICD-10-CM

## 2023-12-08 LAB
CHOLEST SERPL-MCNC: 234 MG/DL (ref 120–199)
CHOLEST/HDLC SERPL: 3.5 {RATIO} (ref 2–5)
HCV AB SERPL QL IA: NORMAL
HDLC SERPL-MCNC: 66 MG/DL (ref 40–75)
HDLC SERPL: 28.2 % (ref 20–50)
LDLC SERPL CALC-MCNC: 148.6 MG/DL (ref 63–159)
NONHDLC SERPL-MCNC: 168 MG/DL
TRIGL SERPL-MCNC: 97 MG/DL (ref 30–150)

## 2023-12-08 PROCEDURE — 80061 LIPID PANEL: CPT | Performed by: STUDENT IN AN ORGANIZED HEALTH CARE EDUCATION/TRAINING PROGRAM

## 2023-12-08 PROCEDURE — 86803 HEPATITIS C AB TEST: CPT | Performed by: STUDENT IN AN ORGANIZED HEALTH CARE EDUCATION/TRAINING PROGRAM

## 2023-12-08 PROCEDURE — 36415 COLL VENOUS BLD VENIPUNCTURE: CPT | Mod: PO | Performed by: STUDENT IN AN ORGANIZED HEALTH CARE EDUCATION/TRAINING PROGRAM

## 2023-12-08 NOTE — LETTER
December 8, 2023    Rehana [address]  [address]  [place] [address]             Dear Rehana    In addition to helping you feel better when you are sick, we are interested in preventing illness and injury in the first place.  Screening tests and routine follow up tests when you have certain medical problems are very essential in helping you stay as healthy as possible. In the spirit of maintaining your health, our system indicates that you are due for the following:  Health Maintenance Due   Topic Date Due    Hepatitis C Screening  Never done    Lipid Panel  Never done    HIV Screening  Never done    TETANUS VACCINE  Never done    Mammogram  Never done    Colorectal Cancer Screening  Never done    Shingles Vaccine (1 of 2) Never done    Influenza Vaccine (1) Never done    COVID-19 Vaccine (3 - 2023-24 season) 09/01/2023        Please be prepared to discuss these recommended tests at your next visit.  If you haven't had a visit within the past one year please call [phone] to schedule or request an appointment.    Sincerely,       Your Health Care Team

## 2023-12-08 NOTE — PROGRESS NOTES
Health Maintenance Due   Topic Date Due    Hepatitis C Screening  Never done    Lipid Panel  Never done    HIV Screening  Never done    TETANUS VACCINE  Never done    Mammogram  Never done    Colorectal Cancer Screening  Never done    Shingles Vaccine (1 of 2) Never done    Influenza Vaccine (1) Never done    COVID-19 Vaccine (3 - 2023-24 season) 09/01/2023    Mailed Fit kit

## 2024-01-08 ENCOUNTER — OCCUPATIONAL HEALTH (OUTPATIENT)
Dept: URGENT CARE | Facility: CLINIC | Age: 54
End: 2024-01-08
Payer: COMMERCIAL

## 2024-01-08 PROCEDURE — 80305 DRUG TEST PRSMV DIR OPT OBS: CPT | Mod: S$GLB,,, | Performed by: PHYSICIAN ASSISTANT

## 2024-01-17 ENCOUNTER — TELEPHONE (OUTPATIENT)
Dept: PRIMARY CARE CLINIC | Facility: CLINIC | Age: 54
End: 2024-01-17
Payer: COMMERCIAL

## 2024-01-17 NOTE — TELEPHONE ENCOUNTER
I call the pt in she don't answer         ----- Message from Angella Machuca sent at 1/17/2024 12:17 PM CST -----  Contact: Pt  [phone]  Patient is returning a phone call.  Who left a message for the patient: Cheryl Shipman MA  Does patient know what this is regarding:  yes refill message  Would you like a call back, or a response through your [facility] portal?:   Call back  Comments:     Patient is requesting a call back concerning the refill request/INCREASE    Please call and advise.    Thank You

## 2024-02-01 ENCOUNTER — ON-DEMAND VIRTUAL (OUTPATIENT)
Dept: URGENT CARE | Facility: CLINIC | Age: 54
End: 2024-02-01
Payer: MEDICAID

## 2024-02-01 ENCOUNTER — PATIENT MESSAGE (OUTPATIENT)
Dept: PRIMARY CARE CLINIC | Facility: CLINIC | Age: 54
End: 2024-02-01
Payer: MEDICAID

## 2024-02-01 DIAGNOSIS — R41.840 CONCENTRATION DEFICIT: Primary | ICD-10-CM

## 2024-02-01 DIAGNOSIS — R30.0 DYSURIA: Primary | ICD-10-CM

## 2024-02-01 PROCEDURE — 99202 OFFICE O/P NEW SF 15 MIN: CPT | Mod: 95,,, | Performed by: NURSE PRACTITIONER

## 2024-02-01 RX ORDER — CIPROFLOXACIN 500 MG/1
500 TABLET ORAL DAILY
Qty: 3 TABLET | Refills: 0 | Status: SHIPPED | OUTPATIENT
Start: 2024-02-01 | End: 2024-02-01

## 2024-02-01 RX ORDER — CIPROFLOXACIN 500 MG/1
500 TABLET ORAL DAILY
Qty: 3 TABLET | Refills: 0 | Status: SHIPPED | OUTPATIENT
Start: 2024-02-01 | End: 2024-02-04

## 2024-02-01 NOTE — PATIENT INSTRUCTIONS
Patient Education       Urinary Tract Infection Discharge Instructions, Adult   About this topic   A urinary tract infection is a UTI. It is caused by germs getting into the urinary tract. The urinary tract is made up of the kidneys, ureters, bladder, and urethra. The urethra is a tube at the bottom of the bladder. Urine flows out of this tube. The germs enter the urethra and then spread in the bladder. The ureters are small tubes that join the bladder and the kidneys. Most UTIs are infections in either your bladder or your kidneys. Bladder infections are more common and may also be called cystitis. Kidney infections are more serious and may also be called pyelonephritis.         What care is needed at home?   Ask your doctor what you need to do when you go home. Make sure you ask questions if you do not understand what the doctor says. This way you will know what you need to do.  Take your drugs as ordered by your doctor.  Unless your doctor has told you otherwise, drink at least 8 to 10 glasses of water or water-based drinks each day. Do not include drinks with caffeine, like coffee or tea.  Do not hold back your urine. Go to the bathroom every 2 to 3 hours.  What follow-up care is needed?   Your doctor may ask you to make visits to the office to check on your progress. Be sure to keep these visits.  What drugs may be needed?   The doctor may order drugs to:  Fight an infection  Help with pain  Numb your bladder  Help you pass your urine more easily  Be sure to talk to your doctor about all of your drugs if you are pregnant.  Will physical activity be limited?   Physical activities will not be limited. You may have to pass urine more often.  What changes to diet are needed?   Do not drink beer, wine, and mixed drinks (alcohol) or caffeine. These can bother the bladder.  Talk to your doctor about drinking cranberry juice.  What can be done to prevent this health problem?   Gently cleanse your genital area each day.  Wipe from front to back to keep germs from going in your body.  If your penis is uncircumcised, retract the foreskin and gently clean around the head of the penis each day.  Consider other methods of birth control instead of a spermicide.  Empty your bladder after having sex.  Empty your bladder before going to sleep.  When do I need to call the doctor?   Signs of infection. These include a fever of 100.4°F (38°C) or higher, chills, back pain, nausea, throwing up, or bloody urine.  Signs come back after treatment ends  You notice more blood in your urine.  Your signs get worse or do not improve within 24 hours of starting treatment.  You are not able to urinate for more than 8 hours.  Your signs come back after treatment has stopped.  Teach Back: Helping You Understand   The Teach Back Method helps you understand the information we are giving you. After you talk with the staff, tell them in your own words what you learned. This helps to make sure the staff has described each thing clearly. It also helps to explain things that may have been confusing. Before going home, make sure you can do these things:  I can tell you about my condition.  I can tell you how to prevent this problem from coming back.  I can tell you what I will do if my signs do not get better after 24 hours of treatment or come back after I have finished treatment.  Where can I learn more?   American Academy of Family Physicians  https://familydoctor.org/condition/urinary-tract-infections/   NHS Choices  https://www.nhs.uk/conditions/urinary-tract-infections-utis/   Last Reviewed Date   2021-06-03  Consumer Information Use and Disclaimer   This information is not specific medical advice and does not replace information you receive from your health care provider. This is only a brief summary of general information. It does NOT include all information about conditions, illnesses, injuries, tests, procedures, treatments, therapies, discharge  instructions or life-style choices that may apply to you. You must talk with your health care provider for complete information about your health and treatment options. This information should not be used to decide whether or not to accept your health care providers advice, instructions or recommendations. Only your health care provider has the knowledge and training to provide advice that is right for you.  Copyright   Copyright © 2021 [employer] and its affiliates and/or licensors. All rights reserved.

## 2024-02-01 NOTE — PROGRESS NOTES
Subjective:      Patient ID: Rehana England is a 53 y.o. female.    Vitals:  vitals were not taken for this visit.     Chief Complaint: Back Pain and Urinary Tract Infection      Visit Type: TELE AUDIOVISUAL    Present with the patient at the time of consultation: TELEMED PRESENT WITH PATIENT: None    Past Medical History:   Diagnosis Date    Anemia     Blood type, Rh negative      Past Surgical History:   Procedure Laterality Date    HYSTERECTOMY       Review of patient's allergies indicates:   Allergen Reactions    Benadryl [diphenhydramine hcl] Other (See Comments)     Agitation      Current Outpatient Medications on File Prior to Visit   Medication Sig Dispense Refill    ferrous sulfate 324 mg (65 mg iron) TbEC Take 1 tablet (324 mg total) by mouth once daily. 90 tablet 1    HYDROcodone-acetaminophen (NORCO) 5-325 mg per tablet Take 1 tablet by mouth every 12 (twelve) hours as needed for Pain. 35 tablet 0    ibuprofen (ADVIL,MOTRIN) 600 MG tablet Take 1 tablet (600 mg total) by mouth every 6 (six) hours as needed for Pain. 30 tablet 1    lisdexamfetamine (VYVANSE) 10 mg Cap Take 1 capsule (10 mg total) by mouth Daily. 30 capsule 0    mirtazapine (REMERON) 7.5 MG Tab Take 1 tablet (7.5 mg total) by mouth every evening. 90 tablet 3     No current facility-administered medications on file prior to visit.     Family History   Problem Relation Age of Onset    Cancer Mother     No Known Problems Sister     No Known Problems Brother     No Known Problems Daughter     No Known Problems Son        Medications Ordered                [id number] - [place] - [address] AT [facility] [address]   [facility] [place] [address]    Telephone: [phone]   Fax: [phone]   Hours: Not open 24 hours                         E-Prescribed (1 of 1)              ciprofloxacin HCl (CIPRO) 500 MG tablet    Sig: Take 1 tablet (500 mg total) by mouth once daily. for 3 days        Start: 2/1/24     Quantity: 3 tablet Refills: 0                           Ohs Peq Odvv Intake    2/1/2024 11:28 AM CST - Filed by Patient   What is your current physical address in the event of a medical emergency?    Are you able to take your vital signs? Yes   Systolic Blood Pressure: 110   Diastolic Blood Pressure: 64   Weight: 136   Height: 65   Pulse: 54   Temperature: 98   Respiration rate: 16   Pulse Oxygen: 100   Please attach any relevant images or files          UTI symptoms for 1 week. + back pain and malodorous urine. Reports frequency, urgency and dysuria. No hematuria. No f/c/n/v. No vaginal discharge. No severe back, flank or pelvic pain.         Back Pain  Associated symptoms include dysuria and pelvic pain. Pertinent negatives include no abdominal pain or fever.   Urinary Tract Infection   Associated symptoms include frequency and urgency. Pertinent negatives include no chills, flank pain, hematuria, nausea or vomiting.       Constitution: Negative for chills and fever.   Gastrointestinal:  Negative for abdominal pain, nausea and vomiting.   Genitourinary:  Positive for dysuria, frequency, urgency and pelvic pain. Negative for flank pain, hematuria, vaginal discharge, vaginal bleeding and vaginal odor.   Musculoskeletal:  Positive for back pain.        Objective:   The physical exam was conducted virtually.  Physical Exam   Constitutional: She is oriented to person, place, and time. She does not appear ill. No distress.   HENT:   Head: Normocephalic and atraumatic.   Nose: Nose normal.   Eyes: Extraocular movement intact   Pulmonary/Chest: Effort normal.   Abdominal: Normal appearance.   Musculoskeletal: Normal range of motion.         General: Normal range of motion.   Neurological: no focal deficit. She is alert and oriented to person, place, and time.   Psychiatric: Her behavior is normal. Mood normal.   Vitals reviewed.      Assessment:     1. Dysuria        Plan:     Patient encouraged to  monitor symptoms closely and instructed to follow-up for new or worsening symptoms. Further, in-person, evaluation may be necessary for continued treatment. Please follow up with your primary care doctor or specialist as needed. Verbally discussed plan. Patient confirms understanding and is in agreement with treatment and plan.     You must understand that you've received a Virtual Care evaluation only and that you may be released before all your medical problems are known or treated. You, the patient, will arrange for follow up care as instructed.    Dysuria  -     ciprofloxacin HCl (CIPRO) 500 MG tablet; Take 1 tablet (500 mg total) by mouth once daily. for 3 days  Dispense: 3 tablet; Refill: 0    Other orders  -     Discontinue: ciprofloxacin HCl (CIPRO) 500 MG tablet; Take 1 tablet (500 mg total) by mouth once daily. for 3 days  Dispense: 3 tablet; Refill: 0      Patient Instructions   Patient Education       Urinary Tract Infection Discharge Instructions, Adult   About this topic   A urinary tract infection is a UTI. It is caused by germs getting into the urinary tract. The urinary tract is made up of the kidneys, ureters, bladder, and urethra. The urethra is a tube at the bottom of the bladder. Urine flows out of this tube. The germs enter the urethra and then spread in the bladder. The ureters are small tubes that join the bladder and the kidneys. Most UTIs are infections in either your bladder or your kidneys. Bladder infections are more common and may also be called cystitis. Kidney infections are more serious and may also be called pyelonephritis.         What care is needed at home?   Ask your doctor what you need to do when you go home. Make sure you ask questions if you do not understand what the doctor says. This way you will know what you need to do.  Take your drugs as ordered by your doctor.  Unless your doctor has told you otherwise, drink at least 8 to 10 glasses of water or water-based drinks  each day. Do not include drinks with caffeine, like coffee or tea.  Do not hold back your urine. Go to the bathroom every 2 to 3 hours.  What follow-up care is needed?   Your doctor may ask you to make visits to the office to check on your progress. Be sure to keep these visits.  What drugs may be needed?   The doctor may order drugs to:  Fight an infection  Help with pain  Numb your bladder  Help you pass your urine more easily  Be sure to talk to your doctor about all of your drugs if you are pregnant.  Will physical activity be limited?   Physical activities will not be limited. You may have to pass urine more often.  What changes to diet are needed?   Do not drink beer, wine, and mixed drinks (alcohol) or caffeine. These can bother the bladder.  Talk to your doctor about drinking cranberry juice.  What can be done to prevent this health problem?   Gently cleanse your genital area each day. Wipe from front to back to keep germs from going in your body.  If your penis is uncircumcised, retract the foreskin and gently clean around the head of the penis each day.  Consider other methods of birth control instead of a spermicide.  Empty your bladder after having sex.  Empty your bladder before going to sleep.  When do I need to call the doctor?   Signs of infection. These include a fever of 100.4°F (38°C) or higher, chills, back pain, nausea, throwing up, or bloody urine.  Signs come back after treatment ends  You notice more blood in your urine.  Your signs get worse or do not improve within 24 hours of starting treatment.  You are not able to urinate for more than 8 hours.  Your signs come back after treatment has stopped.  Teach Back: Helping You Understand   The Teach Back Method helps you understand the information we are giving you. After you talk with the staff, tell them in your own words what you learned. This helps to make sure the staff has described each thing clearly. It also helps to explain things that  may have been confusing. Before going home, make sure you can do these things:  I can tell you about my condition.  I can tell you how to prevent this problem from coming back.  I can tell you what I will do if my signs do not get better after 24 hours of treatment or come back after I have finished treatment.  Where can I learn more?   American Academy of Family Physicians  https://familydoctor.org/condition/urinary-tract-infections/   NHS Choices  https://www.nhs.uk/conditions/urinary-tract-infections-utis/   Last Reviewed Date   2021-06-03  Consumer Information Use and Disclaimer   This information is not specific medical advice and does not replace information you receive from your health care provider. This is only a brief summary of general information. It does NOT include all information about conditions, illnesses, injuries, tests, procedures, treatments, therapies, discharge instructions or life-style choices that may apply to you. You must talk with your health care provider for complete information about your health and treatment options. This information should not be used to decide whether or not to accept your health care providers advice, instructions or recommendations. Only your health care provider has the knowledge and training to provide advice that is right for you.  Copyright   Copyright © 2021 [employer] and its affiliates and/or licensors. All rights reserved.

## 2024-03-20 ENCOUNTER — TELEPHONE (OUTPATIENT)
Dept: PRIMARY CARE CLINIC | Facility: CLINIC | Age: 54
End: 2024-03-20

## 2024-03-20 NOTE — TELEPHONE ENCOUNTER
Pt states she had a uti. Pt states this is her 3rd UTI within one year. Previous message from pt  today stated that she was in sereve pain. 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

## 2024-03-21 ENCOUNTER — PATIENT MESSAGE (OUTPATIENT)
Dept: PRIMARY CARE CLINIC | Facility: CLINIC | Age: 54
End: 2024-03-21
Payer: MEDICAID

## 2024-03-21 DIAGNOSIS — F90.9 ADULT ADHD: ICD-10-CM

## 2024-03-21 DIAGNOSIS — G89.29 CHRONIC RIGHT HIP PAIN: ICD-10-CM

## 2024-03-21 DIAGNOSIS — M25.551 CHRONIC RIGHT HIP PAIN: ICD-10-CM

## 2024-03-21 DIAGNOSIS — F41.9 ANXIETY: ICD-10-CM

## 2024-03-21 NOTE — TELEPHONE ENCOUNTER
Care Due:                  Date            Visit Type   Department     Provider  --------------------------------------------------------------------------------                                ESTABLISHED                              PATIENT -    [facility] PRIMARY  Last Visit: 08-      [facility]      CARE           Savana Aecvedo  Next Visit: None Scheduled  None         None Found                                                            Last  Test          Frequency    Reason                     Performed    Due Date  --------------------------------------------------------------------------------    CBC.........  12 months..  ibuprofen, mirtazapine...  02- 01-    CMP.........  12 months..  ibuprofen, mirtazapine...  02- 01-    Health [facility] Embedded Care Due Messages. Reference number: [id number].   3/21/2024 2:03:54 PM CDT

## 2024-03-21 NOTE — TELEPHONE ENCOUNTER
Spoke with pt yesterday she stated she had a UTI. Now pt is asking for pain medication. Pt is extremely rude and demanding. Please see message below.        Dr. Acevedo, I've left several messages in reference to increased low back pain. Thanks for your referral to pain management holistic. Not, for me. Please could you refill script M16 Vicodin for low lumbar pain. Nothing else seems to work for me right now.        Rehana Loving  3/21/2024 : 1418

## 2024-03-26 RX ORDER — HYDROCODONE BITARTRATE AND ACETAMINOPHEN 5; 325 MG/1; MG/1
1 TABLET ORAL EVERY 12 HOURS PRN
Qty: 35 TABLET | Refills: 0 | OUTPATIENT
Start: 2024-03-26

## 2024-03-26 RX ORDER — LISDEXAMFETAMINE DIMESYLATE CAPSULES 10 MG/1
10 CAPSULE ORAL DAILY
Qty: 30 CAPSULE | Refills: 0 | OUTPATIENT
Start: 2024-03-26

## 2024-03-26 RX ORDER — MIRTAZAPINE 7.5 MG/1
7.5 TABLET, FILM COATED ORAL NIGHTLY
Qty: 90 TABLET | Refills: 3 | OUTPATIENT
Start: 2024-03-26 | End: 2025-03-26

## 2024-03-26 RX ORDER — FERROUS SULFATE 324(65)MG
324 TABLET, DELAYED RELEASE (ENTERIC COATED) ORAL DAILY
Qty: 90 TABLET | Refills: 1 | Status: SHIPPED | OUTPATIENT
Start: 2024-03-26

## 2024-04-02 ENCOUNTER — PATIENT OUTREACH (OUTPATIENT)
Dept: ADMINISTRATIVE | Facility: HOSPITAL | Age: 54
End: 2024-04-02
Payer: MEDICAID

## 2024-04-02 DIAGNOSIS — Z12.12 ENCOUNTER FOR COLORECTAL CANCER SCREENING: ICD-10-CM

## 2024-04-02 DIAGNOSIS — Z11.4 ENCOUNTER FOR HIV (HUMAN IMMUNODEFICIENCY VIRUS) TEST: ICD-10-CM

## 2024-04-02 DIAGNOSIS — Z12.11 ENCOUNTER FOR COLORECTAL CANCER SCREENING: ICD-10-CM

## 2024-04-02 DIAGNOSIS — Z12.31 ENCOUNTER FOR MAMMOGRAM TO ESTABLISH BASELINE MAMMOGRAM: Primary | ICD-10-CM

## 2024-04-02 NOTE — PROGRESS NOTES
Health Maintenance Due   Topic Date Due    Pneumococcal Vaccines (Age 0-64) (1 of 2 - PCV) Never done    HIV Screening  Never done    TETANUS VACCINE  Never done    Mammogram  Never done    Colorectal Cancer Screening  Never done    Shingles Vaccine (1 of 2) Never done    Influenza Vaccine (1) Never done    COVID-19 Vaccine (3 - 2023-24 season) 09/01/2023    - M TO CONTACT THE OFFICE TO SCHEDULE MAMMOGRAM, LAB.MAILED FRIENDLY HEALTH REMINDER.

## 2024-04-02 NOTE — LETTER
April 2, 2024    Rehana Anderson [address]  [address]  [place] [address]             Dear Rehana    In addition to helping you feel better when you are sick, we are interested in preventing illness and injury in the first place.  Screening tests and routine follow up tests when you have certain medical problems are very essential in helping you stay as healthy as possible. In the spirit of maintaining your health, our system indicates that you are due for the following:      Health Maintenance Due   Topic Date Due    Pneumococcal Vaccines (Age 0-64) (1 of 2 - PCV) Never done    HIV Screening  Never done    TETANUS VACCINE  Never done    Mammogram  Never done    Colorectal Cancer Screening  Never done    Shingles Vaccine (1 of 2) Never done    Influenza Vaccine (1) Never done    COVID-19 Vaccine (3 - 2023-24 season) 09/01/2023        Please be prepared to discuss these recommended tests at your next visit.  If you haven't had a visit within the past one year please call [phone] to schedule or request an appointment.    Sincerely,       Your Health Care Team

## 2024-04-11 ENCOUNTER — TELEPHONE (OUTPATIENT)
Dept: EMERGENCY MEDICINE | Facility: HOSPITAL | Age: 54
End: 2024-04-11

## 2024-04-11 ENCOUNTER — HOSPITAL ENCOUNTER (EMERGENCY)
Facility: HOSPITAL | Age: 54
Discharge: HOME | End: 2024-04-11
Attending: EMERGENCY MEDICINE

## 2024-04-11 VITALS
HEART RATE: 69 BPM | SYSTOLIC BLOOD PRESSURE: 105 MMHG | BODY MASS INDEX: 21.66 KG/M2 | TEMPERATURE: 98 F | DIASTOLIC BLOOD PRESSURE: 58 MMHG | OXYGEN SATURATION: 97 % | HEIGHT: 65 IN | RESPIRATION RATE: 18 BRPM | WEIGHT: 130 LBS
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
## 2024-04-11 DIAGNOSIS — H93.13 TINNITUS OF BOTH EARS: ICD-10-CM

## 2024-04-11 DIAGNOSIS — M54.6 ACUTE MIDLINE THORACIC BACK PAIN: Primary | ICD-10-CM

## 2024-04-11 DIAGNOSIS — M54.50 ACUTE MIDLINE LOW BACK PAIN WITHOUT SCIATICA: ICD-10-CM

## 2024-04-11 DIAGNOSIS — K08.89 PAIN, DENTAL: ICD-10-CM

## 2024-04-11 PROCEDURE — 99285 EMERGENCY DEPT VISIT HI MDM: CPT | Mod: 25

## 2024-04-11 PROCEDURE — 25000003 PHARM REV CODE 250

## 2024-04-11 RX ORDER — KETOROLAC TROMETHAMINE 10 MG/1
10 TABLET, FILM COATED ORAL EVERY 6 HOURS
Qty: 20 TABLET | Refills: 0 | Status: SHIPPED | OUTPATIENT
Start: 2024-04-11 | End: 2024-04-16

## 2024-04-11 RX ORDER — AMOXICILLIN AND CLAVULANATE POTASSIUM 875; 125 MG/1; MG/1
1 TABLET, FILM COATED ORAL 2 TIMES DAILY
Qty: 14 TABLET | Refills: 0 | Status: SHIPPED | OUTPATIENT
Start: 2024-04-11 | End: 2024-04-18

## 2024-04-11 RX ORDER — METHOCARBAMOL 500 MG/1
500 TABLET, FILM COATED ORAL
Status: COMPLETED | OUTPATIENT
Start: 2024-04-11 | End: 2024-04-11

## 2024-04-11 RX ORDER — NAPROXEN 500 MG/1
500 TABLET ORAL 2 TIMES DAILY WITH MEALS
Qty: 20 TABLET | Refills: 0 | Status: SHIPPED | OUTPATIENT
Start: 2024-04-11 | End: 2024-04-11 | Stop reason: ALTCHOICE

## 2024-04-11 RX ORDER — METHOCARBAMOL 500 MG/1
500 TABLET, FILM COATED ORAL 3 TIMES DAILY
Qty: 15 TABLET | Refills: 0 | Status: SHIPPED | OUTPATIENT
Start: 2024-04-11 | End: 2024-04-16

## 2024-04-11 RX ORDER — LIDOCAINE 50 MG/G
1 PATCH TOPICAL
Status: DISCONTINUED | OUTPATIENT
Start: 2024-04-11 | End: 2024-04-11 | Stop reason: HOSPADM

## 2024-04-11 RX ORDER — ACETAMINOPHEN 500 MG
1000 TABLET ORAL
Status: DISCONTINUED | OUTPATIENT
Start: 2024-04-11 | End: 2024-04-11 | Stop reason: HOSPADM

## 2024-04-11 RX ADMIN — METHOCARBAMOL TABLETS 500 MG: 500 TABLET, COATED ORAL at 10:04

## 2024-04-11 NOTE — ED PROVIDER NOTES
Encounter Date: 4/11/2024       History     Chief Complaint   Patient presents with    Back Pain    Tinnitus     Patient is a 53 y.o. female with past medical history of chronic back pain who presents to ED via EMS for concern for back pain which began around 1:15 a.m..  Patient reports she was walking on foot when she tripped and twisted her back.  Patient complaining of pain to her upper and lower back.  Patient reports she was chronic low back pain but it was he was to be hurting her worse than normal.  Patient denies saddle anesthesia or changes in bowel or bladder.  Patient denies fever.  Patient reports it was a twisting injury and denies fall to the ground.  Patient denies head injury.  Patient reports pain in both of her hips.  Patient reports she has chronic ringing in both ears for the last 4 years.  Patient denies headache or dizziness.  Patient denies any changes in the ringing in her ears.  Patient reports she was currently homeless in her car does not work so she travels by foot.  Patient reports she was had bilateral tooth pain for about 4 weeks.  Patient reports she needs to go see the dentist but she has not yet.  Patient is awake and alert in no acute distress.      Review of patient's allergies indicates:   Allergen Reactions    Benadryl [diphenhydramine hcl] Other (See Comments)     Agitation      Past Medical History:   Diagnosis Date    Anemia     Blood type, Rh negative      Past Surgical History:   Procedure Laterality Date    HYSTERECTOMY       Family History   Problem Relation Age of Onset    Cancer Mother     No Known Problems Sister     No Known Problems Brother     No Known Problems Daughter     No Known Problems Son      Social History     Tobacco Use    Smoking status: Never   Substance Use Topics    Alcohol use: Yes     Comment: socially    Drug use: Not Currently     Review of Systems   Constitutional: Negative.  Negative for fever.   HENT:  Positive for tinnitus. Negative for  congestion, ear discharge, ear pain, sore throat and trouble swallowing.    Respiratory: Negative.  Negative for shortness of breath.    Cardiovascular: Negative.  Negative for chest pain.   Gastrointestinal: Negative.  Negative for abdominal pain, nausea and vomiting.   Genitourinary:  Negative for dysuria.   Musculoskeletal:  Positive for back pain. Negative for joint swelling and neck stiffness.   Skin:  Negative for rash.   Neurological: Negative.  Negative for weakness.   Hematological:  Does not bruise/bleed easily.   Psychiatric/Behavioral: Negative.         Physical Exam     Initial Vitals [04/11/24 0317]   BP Pulse Resp Temp SpO2   (!) 105/58 69 18 98.3 °F (36.8 °C) 97 %      MAP       --         Physical Exam    Nursing note and vitals reviewed.  Constitutional: She appears well-developed and well-nourished. She is not diaphoretic. No distress.   HENT:   Head: Normocephalic and atraumatic.   Right Ear: Tympanic membrane, external ear and ear canal normal.   Left Ear: Tympanic membrane, external ear and ear canal normal.   Nose: Nose normal.   Mouth/Throat: Uvula is midline, oropharynx is clear and moist and mucous membranes are normal. No oral lesions. No trismus in the jaw. Abnormal dentition. No dental abscesses or uvula swelling.   Eyes: Conjunctivae and EOM are normal.   Neck:   Normal range of motion.  Cardiovascular:  Normal rate, regular rhythm, normal heart sounds and intact distal pulses.     Exam reveals no gallop and no friction rub.       No murmur heard.  Pulmonary/Chest: Breath sounds normal. No respiratory distress. She has no wheezes. She has no rhonchi. She has no rales. She exhibits no tenderness.   Musculoskeletal:      Cervical back: Normal and normal range of motion.      Thoracic back: Bony tenderness present. No swelling, deformity, signs of trauma, lacerations or tenderness. Normal range of motion.      Lumbar back: Tenderness and bony tenderness present. No swelling, edema,  deformity or signs of trauma. Normal range of motion.      Right hip: Bony tenderness present. No deformity, lacerations or crepitus. Normal range of motion.      Left hip: Bony tenderness present. No deformity, lacerations or crepitus. Normal range of motion.      Right foot: Normal.      Left foot: Normal.     Neurological: She is alert and oriented to person, place, and time. She has normal strength. GCS score is 15. GCS eye subscore is 4. GCS verbal subscore is 5. GCS motor subscore is 6.   Skin: Skin is warm and dry. Capillary refill takes less than 2 seconds.   Psychiatric: She has a normal mood and affect. Her behavior is normal. Judgment and thought content normal.         ED Course   Procedures  Labs Reviewed - No data to display       Imaging Results              CT Lumbar Spine Without Contrast (Final result)  Result time 04/11/24 12:22:36      Final result by Aleksandra Payne MD (04/11/24 12:22:36)                   Impression:      Mild left foraminal narrowing at L3-4    Mild degenerative changes at L4-5 without central canal stenosis or foraminal narrowing    No acute osseous abnormality      Electronically signed by: Aleksandra Payne  Date:    04/11/2024  Time:    12:22               Narrative:      CMS MANDATED QUALITY DATA - CT RADIATION - 436    All CT scans at this facility utilize dose modulation, iterative reconstruction, and/or weight based dosing when appropriate to reduce radiation dose to as low as reasonably achievable.    EXAMINATION:  CT LUMBAR SPINE WITHOUT CONTRAST    CLINICAL HISTORY:  Lumbar radiculopathy, symptoms persist with conservative treatment;    TECHNIQUE:  CT lumbar spine without obtained with coronal and sagittal reformations.    COMPARISON:  None    FINDINGS:  The lumbar spine is in satisfactory alignment.  The vertebral bodies are normal height.  There is mild disc space narrowing with degenerative endplate changes at L4-5.    At T12-L1, L1-2, L2-3 there is no  significant abnormality.    At L3-4 there is a shallow disc bulge asymmetric to the left resulting in mild left foraminal narrowing.  There is no central canal stenosis.    At L4-5 there is mild disc space narrowing with shallow disc bulge without central canal stenosis or foraminal narrowing    At at L5-S1 there is no significant abnormality    Paraspinous soft tissues are normal.    The visualized sacrum and SI joints are normal                                       CT Thoracic Spine Without Contrast (Final result)  Result time 04/11/24 12:23:29      Final result by Landen Parr MD (04/11/24 12:23:29)                   Impression:      1. No evidence of thoracic fracture or subluxation.  2. Mild multilevel degenerative change, without CT evidence of high-grade spinal stenosis or nerve root impingement.  While no obvious large disc protrusion is identified, CT sensitivity is relatively low in that regard.  3. Subcentimeter lucent lesions at T9 and T11, indeterminate.  In the absence of prior studies which could confirm stability, CT follow-up in 6 months could be utilized.  Alternatively, follow-up MR could be considered.      Electronically signed by: Landen Parr  Date:    04/11/2024  Time:    12:23               Narrative:    EXAMINATION:  CT THORACIC SPINE WITHOUT CONTRAST    CLINICAL HISTORY:  Back pain    COMPARISON:  None    FINDINGS:  Thin-section axial noncontrast images were obtained, with reformatted imaging in the sagittal and coronal planes.    There is no evidence of thoracic fracture or subluxation. Paraspinous soft tissues are unremarkable.    Changes of mild multilevel cervical degenerative facet disease and minimal degenerative disc disease are noted, without CT evidence of high-grade spinal stenosis or definite nerve root impingement.  No large disc protrusion is identified, however CT sensitivity in that regard is relatively low.    Subcentimeter lucent lesions are noted in the T11  vertebral body to the left of midline and at the T9 vertebral body posteriorly to the left of midline.  These are indeterminate.                                       X-Ray Hips Bilateral 2 View Incl AP Pelvis (Final result)  Result time 04/11/24 11:48:30      Final result by Aleksandra Payne MD (04/11/24 11:48:30)                   Impression:      No acute fracture or dislocation.      Electronically signed by: Aleksandra Payne  Date:    04/11/2024  Time:    11:48               Narrative:    CLINICAL HISTORY:  (MRN [id number])54 y/o  (1970) F    Pain in unspecified hip    TECHNIQUE:  (A# [id number], Exam time 4/11/2024 11:37)    [id number] XR HIPS BILATERAL 2 VIEW INCL AP PELVIS  view(s) obtained.    COMPARISON:  None available.    FINDINGS:  No acute fracture or dislocation. The joints and interspaces appear to be maintained.  Soft tissues appear radiographically within normal limits and no radiopaque foreign body is seen.  The hip joints and SI joints are symmetrical.                                       Medications   methocarbamoL tablet 500 mg (500 mg Oral Given 4/11/24 1037)     Medical Decision Making  MDM    Patient presents for emergent evaluation of acute back pain that poses a possible threat to life and/or bodily function.    Differential diagnosis included but not limited to fracture, dislocation, subluxation, dental abscess, dental pain.  In the ED patient found to have acute pain on palpation of thoracic and lumbar back with no obvious signs of injury or trauma.  Patient has a normal strength in bilateral lower and upper extremities.  Patient able to stand up and walk unassisted.  Patient has normal sensation and pulses distally in bilateral lower extremities.  Patient was normal TMs bilaterally.  Patient has chronic tinnitus for 4 years in his it was not a new symptom.  Patient denies headaches lightheaded or dizziness.  Patient is neurovascularly intact.  Patient is oriented x3.  Patient reports  pain to bilateral lower teeth with no obvious signs of dental abscess.  Patient has no facial swelling.  Patient has no trismus..      Discharge MDM  I discussed the patient presentation, X-rays, CT findings with my attending Dr. Moscoso who individually evaluated the patient.    Patient was managed in the ED with oral Robaxin.    The response to treatment was good.   gave patient resources for shelters.  Patient was discharged in stable condition.  Patient ambulatory out of the ED without difficulty.  Detailed return precautions discussed to return to the ED for any new or worsening symptoms.  Patient verbalized understanding.    I eat prescribe patient antibiotics for her dental pain.  I attempted to call patient's phone number listed in the chart however this is not a working number.  Patient sent a [employer] message to notify her of antibiotic prescription.  This was discussed with the patient while patient was in the ED.    NP uses Epic and voice recognition software prone to occasional and minor errors that may persist in the medical record.     Attending Note:  I personally saw and evaluated this patient.  I discussed the patient's care with Advanced Practice Clinician.  I reviewed their note and agree with the history, physical, assessment, diagnosis, treatment, all procedures performed, xray and EKG interpretations and discharge plan provided by the Advanced Practice Clinician. My overall impression is acute thoracic and lumbar back pain status post fall, chronic tinnitus  The patient has been instructed to follow up with their physician or the one provided as well as specific return precautions.  Patient reports she sees rory William in the past and follow up with him or orthopedist of her choice memory.  She was able to ambulate in the ER walking behind a wheelchair that has been personal belongings she reports she was doing this for support but he was able to ambulate on her  own.  Ame Moscoso M.D. 4/11/2024 12:38 PM        Amount and/or Complexity of Data Reviewed  Radiology: ordered. Decision-making details documented in ED Course.    Risk  OTC drugs.  Prescription drug management.               ED Course as of 04/11/24 1726   Thu Apr 11, 2024   1147 X-ray bilateral hips and pelvis no fracture dislocation seen [RM]   1317 CT Lumbar Spine Without Contrast  Impression:  Mild left foraminal narrowing at L3-4  Mild degenerative changes at L4-5 without central canal stenosis or foraminal narrowing  No acute osseous abnormality [MP]   1319 CT Thoracic Spine Without Contrast  Impression:  1. No evidence of thoracic fracture or subluxation.  2. Mild multilevel degenerative change, without CT evidence of high-grade spinal stenosis or nerve root impingement.  While no obvious large disc protrusion is identified, CT sensitivity is relatively low in that regard.  3. Subcentimeter lucent lesions at T9 and T11, indeterminate.  In the absence of prior studies which could confirm stability, CT follow-up in 6 months could be utilized.  Alternatively, follow-up MR could be considered. [MP]      ED Course User Index  [MP] Kaur Hutson NP  [RM] Ame Moscoso MD                           Clinical Impression:  Final diagnoses:  [M54.6] Acute midline thoracic back pain (Primary)  [M54.50] Acute midline low back pain without sciatica  [H93.13] Tinnitus of both ears  [W19.XXXA] Fall, initial encounter  [K08.89] Pain, dental          ED Disposition Condition    Discharge Stable          ED Prescriptions       Medication Sig Dispense Start Date End Date Auth. Provider    naproxen (NAPROSYN) 500 MG tablet  (Status: Discontinued) Take 1 tablet (500 mg total) by mouth 2 (two) times daily with meals. for 10 days 20 tablet 4/11/2024 4/11/2024 Ame Moscoso MD    methocarbamoL (ROBAXIN) 500 MG Tab Take 1 tablet (500 mg total) by mouth 3 (three) times daily. for 5 days 15 tablet 4/11/2024  4/16/2024 Ame Moscoso MD    ketorolac (TORADOL) 10 mg tablet Take 1 tablet (10 mg total) by mouth every 6 (six) hours. for 5 days 20 tablet 4/11/2024 4/16/2024 Ame Moscoso MD    amoxicillin-clavulanate 875-125mg (AUGMENTIN) 875-125 mg per tablet Take 1 tablet by mouth 2 (two) times daily. for 7 days 14 tablet 4/11/2024 4/18/2024 Kaur Hutson NP          Follow-up Information       Follow up With Specialties Details Why Contact Info Additional Information    Savana Acevedo MD Family Medicine Schedule an appointment as soon as possible for a visit   [address] [facility] [address]  [phone]       [facility] - Emergency Dept Emergency Medicine Go to  If symptoms worsen [address] [facility] [id number] [phone] 1st floor             Kaur Hutson NP  04/11/24 0553

## 2024-04-12 ENCOUNTER — PATIENT OUTREACH (OUTPATIENT)
Dept: EMERGENCY MEDICINE | Facility: HOSPITAL | Age: 54
End: 2024-04-12

## 2024-04-21 ENCOUNTER — NURSE TRIAGE (OUTPATIENT)
Dept: ADMINISTRATIVE | Facility: CLINIC | Age: 54
End: 2024-04-21
Payer: MEDICAID

## 2024-04-21 NOTE — TELEPHONE ENCOUNTER
"Pt was seen in ED on 4/11/24. She states that she is experiencing continued back pain. Pt states that she does not wish "to spend another 12 hours waiting in the Emergency Room" to be re-evaluated. Pt declines triage. NT advises that to have another medication prescribed, pt will have to be re-evaluated via [facility], [facility], or ED. Pt states that she will go to [facility] and hangs up.   Reason for Disposition   Caller hangs up    Additional Information   Negative: Violent behavior, or threatening to physically hurt or kill someone   Negative: [1] Caller is very confused AND [2] no other adult (e.g., friend or family member) available   Negative: Sounds like a life-threatening emergency to the triager   Negative: Patient sounds very sick or weak to the triager   Negative: [1] [place] worried caller AND [2] second call within 4 hours about the same medical problem   Negative: [1] [place] worried caller AND [2] third call within 48 hours about the same medical problem   Negative: [1] [place] worried caller AND [2] can't be reassured by triager   Negative: [1] Caller demands to speak with the PCP AND [2] about sick adult (or sick caller)   Negative: [1] Angry or rude caller AND [2] doesn't respond to 5 minutes of triager counseling AND [3] sick adult (or caller)   Negative: [1] Caller mainly has complaints about past medical care, billing, etc AND [2] has mild symptoms or is well   Negative: Difficult caller responded to phone counseling   Negative: Caller is requesting health information that triager feels is unethical or illegal   Negative: [1] Caller continues to be verbally abusive AND [2] after triager sets BOUNDARIES AND [3] Triager ends call    Protocols used: Difficult Caller-A-    "

## 2024-04-22 ENCOUNTER — OFFICE VISIT (OUTPATIENT)
Dept: URGENT CARE | Facility: CLINIC | Age: 54
End: 2024-04-22
Payer: MEDICAID

## 2024-04-22 VITALS
OXYGEN SATURATION: 99 % | DIASTOLIC BLOOD PRESSURE: 72 MMHG | BODY MASS INDEX: 21.66 KG/M2 | SYSTOLIC BLOOD PRESSURE: 108 MMHG | HEIGHT: 65 IN | HEART RATE: 53 BPM | TEMPERATURE: 98 F | RESPIRATION RATE: 16 BRPM | WEIGHT: 130 LBS

## 2024-04-22 DIAGNOSIS — M25.551 BILATERAL HIP PAIN: ICD-10-CM

## 2024-04-22 DIAGNOSIS — M54.40 ACUTE MIDLINE LOW BACK PAIN WITH SCIATICA, SCIATICA LATERALITY UNSPECIFIED: Primary | ICD-10-CM

## 2024-04-22 DIAGNOSIS — M25.552 BILATERAL HIP PAIN: ICD-10-CM

## 2024-04-22 PROCEDURE — 99204 OFFICE O/P NEW MOD 45 MIN: CPT | Mod: S$GLB,,, | Performed by: NURSE PRACTITIONER

## 2024-04-22 RX ORDER — TIZANIDINE 4 MG/1
4 TABLET ORAL EVERY 6 HOURS PRN
Qty: 20 TABLET | Refills: 0 | Status: SHIPPED | OUTPATIENT
Start: 2024-04-22 | End: 2024-05-02

## 2024-04-22 RX ORDER — PREDNISONE 20 MG/1
40 TABLET ORAL DAILY
Qty: 10 TABLET | Refills: 0 | Status: SHIPPED | OUTPATIENT
Start: 2024-04-22 | End: 2024-04-27

## 2024-04-22 NOTE — TELEPHONE ENCOUNTER
Spoke with pt. Pt states that she had a fall on 4-11-24 and that the medication the E.r prescribe is not working. Pt states that she is on her way to the urgent care, but  would still like Dr. Acevedo to prescribe her something for the pain. Pt states she tripped over a broken tree.

## 2024-04-22 NOTE — PROGRESS NOTES
"Subjective:      Patient ID: Rehana England is a 54 y.o. female.    Vitals:  height is 5' 5" (1.651 m) and weight is 59 kg (130 lb). Her oral temperature is 98.2 °F (36.8 °C). Her blood pressure is 108/72 and her pulse is 53 (abnormal). Her respiration is 16 and oxygen saturation is 99%.     Chief Complaint: Back Pain    Rehana England is a 54 year old female presenting to the clinic with c/o lower back and bilateral hip pain.  Pain is radiating down into both legs and feet are tingling.  Patient had a fall on 4/11 and tried to catch herself with both hands.   Patient has been taking robaxin and naproxen, with no relief.   Patient initially went to ER the day of incident and had imaging done, with no acute findings. She denies saddle anesthesia, bowel/bladder incontinence.     Back Pain  This is a new problem. The current episode started 1 to 4 weeks ago. The pain is present in the lumbar spine and sacro-iliac. The pain radiates to the left thigh, right thigh, right foot and left foot. The pain is at a severity of 8/10. The symptoms are aggravated by sitting (sitting upright). Associated symptoms include leg pain and tingling. Treatments tried: robaxin.       Constitution: Negative.   HENT: Negative.     Neck: neck negative.   Eyes: Negative.    Respiratory: Negative.     Gastrointestinal: Negative.    Genitourinary: Negative.    Musculoskeletal:  Positive for joint pain (bilateral hips) and back pain.      Objective:     Physical Exam   Constitutional: She is oriented to person, place, and time. She appears well-developed. She is cooperative.  Non-toxic appearance. She does not appear ill. No distress.   HENT:   Head: Normocephalic and atraumatic.   Ears:   Right Ear: Hearing and external ear normal.   Left Ear: Hearing and external ear normal.   Nose: Nose normal. No mucosal edema, rhinorrhea or nasal deformity. No epistaxis. Right sinus exhibits no maxillary sinus tenderness and no frontal sinus tenderness. " Left sinus exhibits no maxillary sinus tenderness and no frontal sinus tenderness.   Mouth/Throat: Uvula is midline, oropharynx is clear and moist and mucous membranes are normal. No trismus in the jaw. Normal dentition. No uvula swelling. No oropharyngeal exudate, posterior oropharyngeal edema or posterior oropharyngeal erythema.   Eyes: Conjunctivae and lids are normal. No scleral icterus.   Neck: Trachea normal and phonation normal. Neck supple. No edema present. No erythema present. No neck rigidity present.   Cardiovascular: Normal rate, regular rhythm, normal heart sounds and normal pulses.   Pulmonary/Chest: Effort normal and breath sounds normal. No respiratory distress. She has no decreased breath sounds. She has no rhonchi.   Abdominal: Normal appearance.   Musculoskeletal:         General: No deformity.      Lumbar back: She exhibits tenderness and bony tenderness.        Back:       Comments: Diffuse tenderness to low back. Able to bear weight on bilateral lower extremities and ambulate without assistance   Neurological: She is alert and oriented to person, place, and time. She exhibits normal muscle tone. Coordination normal.   Skin: Skin is warm, dry, intact, not diaphoretic and not pale.   Psychiatric: Her speech is normal and behavior is normal. Judgment and thought content normal.   Nursing note and vitals reviewed.      Assessment:     1. Acute midline low back pain with sciatica, sciatica laterality unspecified    2. Bilateral hip pain        Plan:   Review of patient's prior ER visit shows xray of bilateral hips and CT of thoracic and lumbar spine were all performed. No acute findings on these imaging studies. She was prescribed robaxin and naproxen and states these medications are not relieving her pain. She has no red flag symptoms concerning for cauda equina. She has had no new injury/trauma to the back. She is specifically requesting Percocet or oxycodone for her pain. She has a history of  chronic back pain and has been prescribed narcotics by her PCP in the past. Advised patient that she will need to see her PCP for narcotic pain medication but will change robaxin to zanaflex, add tylenol and prednisone, and continue naproxen or ibuprofen. Lidoderm patch and diclofenac gel offered. Patient became very agitated and stated she did not want any medications if I was not going to prescribe narcotic pain medication for her.     Acute midline low back pain with sciatica, sciatica laterality unspecified    Bilateral hip pain    Other orders  -     tiZANidine (ZANAFLEX) 4 MG tablet; Take 1 tablet (4 mg total) by mouth every 6 (six) hours as needed (muscle spasm).  Dispense: 20 tablet; Refill: 0  -     predniSONE (DELTASONE) 20 MG tablet; Take 2 tablets (40 mg total) by mouth once daily. for 5 days  Dispense: 10 tablet; Refill: 0

## 2024-04-24 NOTE — TELEPHONE ENCOUNTER
Spoke with pt. Pt stated  she would seek further treatment through an chiropractor or another doctor. Pt stated that the medication the urgent care prescribe her is not working.

## 2024-04-24 NOTE — TELEPHONE ENCOUNTER
I will not be prescribing narcotic pain medication and what she was prescribed by urgent care based on evaluation was appropriate.

## 2024-06-03 ENCOUNTER — PATIENT OUTREACH (OUTPATIENT)
Dept: ADMINISTRATIVE | Facility: HOSPITAL | Age: 54
End: 2024-06-03
Payer: COMMERCIAL

## 2024-06-03 NOTE — PROGRESS NOTES
Health Maintenance Due   Topic Date Due    Pneumococcal Vaccines (Age 0-64) (1 of 2 - PCV) Never done    HIV Screening  Never done    TETANUS VACCINE  Never done    Mammogram  Never done    Colorectal Cancer Screening  Never done    Shingles Vaccine (1 of 2) Never done    COVID-19 Vaccine (3 - 2023-24 season) 09/01/2023    - [facility] to contact the office to schedule ,mammogram,colorectal ,lab . Mailed reminder.

## 2024-06-03 NOTE — LETTER
Mansi 3, 2024    Rehana Anderson [place]  [address]  [place] [address]             Dear Rehana    In addition to helping you feel better when you are sick, we are interested in preventing illness and injury in the first place.  Screening tests and routine follow up tests when you have certain medical problems are very essential in helping you stay as healthy as possible. In the spirit of maintaining your health, our system indicates that you are due for the following:    Health Maintenance Due   Topic Date Due    Pneumococcal Vaccines (Age 0-64) (1 of 2 - PCV) Never done    HIV Screening  Never done    TETANUS VACCINE  Never done    Mammogram  Never done    Colorectal Cancer Screening  Never done    Shingles Vaccine (1 of 2) Never done    COVID-19 Vaccine (3 - 2023-24 season) 09/01/2023        Please be prepared to discuss these recommended tests at your next visit.  If you haven't had a visit within the past one year please call [phone] to schedule or request an appointment.    Sincerely,       Your Health Care Team

## 2024-06-10 ENCOUNTER — OFFICE VISIT (OUTPATIENT)
Dept: PRIMARY CARE CLINIC | Facility: CLINIC | Age: 54
End: 2024-06-10
Payer: COMMERCIAL

## 2024-06-10 ENCOUNTER — TELEPHONE (OUTPATIENT)
Dept: PRIMARY CARE CLINIC | Facility: CLINIC | Age: 54
End: 2024-06-10
Payer: COMMERCIAL

## 2024-06-10 ENCOUNTER — PATIENT MESSAGE (OUTPATIENT)
Dept: PSYCHIATRY | Facility: CLINIC | Age: 54
End: 2024-06-10
Payer: COMMERCIAL

## 2024-06-10 VITALS
SYSTOLIC BLOOD PRESSURE: 110 MMHG | HEIGHT: 65 IN | DIASTOLIC BLOOD PRESSURE: 73 MMHG | HEART RATE: 60 BPM | OXYGEN SATURATION: 99 % | WEIGHT: 142.06 LBS | BODY MASS INDEX: 23.67 KG/M2

## 2024-06-10 DIAGNOSIS — F33.9 DEPRESSION, RECURRENT: ICD-10-CM

## 2024-06-10 DIAGNOSIS — M25.551 RIGHT HIP PAIN: ICD-10-CM

## 2024-06-10 DIAGNOSIS — F41.9 ANXIETY: ICD-10-CM

## 2024-06-10 DIAGNOSIS — Z86.39 HISTORY OF IRON DEFICIENCY: Primary | ICD-10-CM

## 2024-06-10 DIAGNOSIS — M54.50 ACUTE MIDLINE LOW BACK PAIN WITHOUT SCIATICA: ICD-10-CM

## 2024-06-10 PROCEDURE — 3074F SYST BP LT 130 MM HG: CPT | Mod: CPTII,S$GLB,, | Performed by: FAMILY MEDICINE

## 2024-06-10 PROCEDURE — 99215 OFFICE O/P EST HI 40 MIN: CPT | Mod: 25,S$GLB,, | Performed by: FAMILY MEDICINE

## 2024-06-10 PROCEDURE — 96372 THER/PROPH/DIAG INJ SC/IM: CPT | Mod: S$GLB,,, | Performed by: FAMILY MEDICINE

## 2024-06-10 PROCEDURE — 3078F DIAST BP <80 MM HG: CPT | Mod: CPTII,S$GLB,, | Performed by: FAMILY MEDICINE

## 2024-06-10 PROCEDURE — 3008F BODY MASS INDEX DOCD: CPT | Mod: CPTII,S$GLB,, | Performed by: FAMILY MEDICINE

## 2024-06-10 PROCEDURE — 99999 PR PBB SHADOW E&M-EST. PATIENT-LVL IV: CPT | Mod: PBBFAC,,, | Performed by: FAMILY MEDICINE

## 2024-06-10 PROCEDURE — 1159F MED LIST DOCD IN RCRD: CPT | Mod: CPTII,S$GLB,, | Performed by: FAMILY MEDICINE

## 2024-06-10 RX ORDER — CYCLOBENZAPRINE HCL 5 MG
5 TABLET ORAL 3 TIMES DAILY PRN
Qty: 30 TABLET | Refills: 1 | Status: SHIPPED | OUTPATIENT
Start: 2024-06-10

## 2024-06-10 RX ORDER — DICLOFENAC SODIUM 10 MG/G
2 GEL TOPICAL 4 TIMES DAILY
Qty: 100 G | Refills: 1 | Status: SHIPPED | OUTPATIENT
Start: 2024-06-10

## 2024-06-10 RX ORDER — KETOROLAC TROMETHAMINE 30 MG/ML
30 INJECTION, SOLUTION INTRAMUSCULAR; INTRAVENOUS
Status: COMPLETED | OUTPATIENT
Start: 2024-06-10 | End: 2024-06-10

## 2024-06-10 RX ADMIN — KETOROLAC TROMETHAMINE 30 MG: 30 INJECTION, SOLUTION INTRAMUSCULAR; INTRAVENOUS at 03:06

## 2024-06-10 NOTE — TELEPHONE ENCOUNTER
----- Message from Steffi Vargas sent at 6/10/2024  8:20 AM CDT -----  Contact: [phone]  Pt is requesting to be seen today, there is nothing available until tomorrow. Pt states provider will get her in today and she only has transportation for today    Please call pt and advise

## 2024-06-10 NOTE — PROGRESS NOTES
Clinic Note  6/10/2024      Subjective:       Patient ID:  Rehana is a 54 y.o. female being seen for an new visit.      Chief Complaint:  Multiple concerns    Midline back pain and hip pain-patient reports that about 3 months ago patient was pulled over by police when she tripped and fell on hurt her back and right hip.  Three weeks ago had a similar situation where she was outside where her right leg fell in the whole and has caused a flare-up of her midline back pain and right hip.  Reports having stopping sound of her right hip.  In severe pain.  Went to emergency room and was given naproxen, tizanidine, Robaxin which makes patient very sleepy, unclear if it is helpful for pain.  Pain has not improved at all over the last 3 weeks.  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  Twenty years ago patient was on Adderall for short period of time after her divorce.  Strattera not helpful, was recently prescribed Vyvanse which patient did not like.  Taking mirtazapine at night.      Family History   Problem Relation Name Age of Onset    Cancer Mother      No Known Problems Sister      No Known Problems Brother      No Known Problems Daughter      No Known Problems Son       Social History     Socioeconomic History    Marital status: Single   Tobacco Use    Smoking status: Never   Substance and Sexual Activity    Alcohol use: Yes     Comment: socially    Drug use: Not Currently    Sexual activity: Yes     Partners: Male     Social Determinants of Health     Financial Resource Strain: Unknown (5/14/2021)    Overall Financial Resource Strain (CARDIA)     Difficulty of Paying Living Expenses: Patient declined   Food Insecurity: No Food Insecurity (5/14/2021)    Hunger Vital Sign     Worried About Running Out of Food in the Last Year: Never true     Ran Out of Food in the Last  Year: Never true   Transportation Needs: No Transportation Needs (5/14/2021)    PRAPARE - Transportation     Lack of Transportation (Medical): No     Lack of Transportation (Non-Medical): No   Physical Activity: Inactive (5/14/2021)    Exercise Vital Sign     Days of Exercise per Week: 0 days     Minutes of Exercise per Session: 0 min   Housing Stability: Unknown (5/14/2021)    Housing Stability Vital Sign     Unable to Pay for Housing in the Last Year: No     Unstable Housing in the Last Year: No     Past Surgical History:   Procedure Laterality Date    HYSTERECTOMY       Medication List with Changes/Refills   New Medications    CYCLOBENZAPRINE (FLEXERIL) 5 MG TABLET    Take 1 tablet (5 mg total) by mouth 3 (three) times daily as needed for Muscle spasms. (Don't take with tizanidine or robaxin)    DICLOFENAC SODIUM (VOLTAREN) 1 % GEL    Apply 2 g topically 4 (four) times daily.   Current Medications    FERROUS SULFATE 324 MG (65 MG IRON) TBEC    Take 1 tablet (324 mg total) by mouth once daily.    IBUPROFEN (ADVIL,MOTRIN) 600 MG TABLET    Take 1 tablet (600 mg total) by mouth every 6 (six) hours as needed for Pain.    MIRTAZAPINE (REMERON) 7.5 MG TAB    Take 1 tablet (7.5 mg total) by mouth every evening.   Discontinued Medications    HYDROCODONE-ACETAMINOPHEN (NORCO) 5-325 MG PER TABLET    Take 1 tablet by mouth every 12 (twelve) hours as needed for Pain.    LISDEXAMFETAMINE (VYVANSE) 10 MG CAP    Take 1 capsule (10 mg total) by mouth Daily.     Patient Active Problem List   Diagnosis    Depression, recurrent    Anxiety     Review of Systems   Constitutional:  Negative for chills, fever, malaise/fatigue and weight loss.   HENT:  Negative for congestion, sinus pain and sore throat.    Respiratory:  Negative for cough, shortness of breath and wheezing.    Cardiovascular:  Negative for chest pain and palpitations.   Gastrointestinal:  Negative for constipation, diarrhea, nausea and vomiting.   Genitourinary:   "Negative for dysuria, frequency and urgency.   Musculoskeletal:  Positive for back pain, falls and joint pain. Negative for myalgias and neck pain.   Skin:  Negative for rash.   Neurological:  Negative for headaches.         Objective:      /73 (BP Location: Left arm, Patient Position: Sitting, BP Method: Large (Automatic))   Pulse 60   Ht 5' 5" (1.651 m)   Wt 64.4 kg (142 lb 1.4 oz)   SpO2 99%   BMI 23.64 kg/m²   Estimated body mass index is 23.64 kg/m² as calculated from the following:    Height as of this encounter: 5' 5" (1.651 m).    Weight as of this encounter: 64.4 kg (142 lb 1.4 oz).  Physical Exam  Vitals reviewed.   Constitutional:       General: She is not in acute distress.     Appearance: She is not diaphoretic.   HENT:      Head: Normocephalic and atraumatic.   Eyes:      Conjunctiva/sclera: Conjunctivae normal.   Cardiovascular:      Rate and Rhythm: Normal rate and regular rhythm.      Heart sounds: Normal heart sounds.   Pulmonary:      Effort: Pulmonary effort is normal. No respiratory distress.      Breath sounds: Normal breath sounds. No wheezing.   Abdominal:      General: Bowel sounds are normal.      Palpations: Abdomen is soft.   Musculoskeletal:         General: Normal range of motion.      Cervical back: Normal range of motion.        Back:         Legs:    Skin:     General: Skin is warm and dry.      Findings: No erythema or rash.   Neurological:      Mental Status: She is alert and oriented to person, place, and time.   Psychiatric:         Mood and Affect: Mood and affect normal.         Behavior: Behavior normal.         Thought Content: Thought content normal.         Judgment: Judgment normal.           Assessment and Plan:     1. History of iron deficiency  - CBC Auto Differential; Future  - Iron and TIBC; Future  - Ferritin; Future    2. Anxiety  - patient with poor concentration that did not improve with Vyvanse, only improved with Adderall.  Discussed with patient that " her most likely reason for why she is having poor concentration is due to her uncontrolled anxiety and depression, has been on a trial of multiple different medication including different SSRIs, SNRI without much improvement of symptoms.  Discussed with patient that she will most likely need to go to counseling to work through her anxieties in for true healing  - Ambulatory referral/consult to Psychiatry; Future  - Ambulatory referral/consult to Psychology; Future    3. Depression, recurrent  - Ambulatory referral/consult to Psychiatry; Future  - Ambulatory referral/consult to Psychology; Future    4. Right hip pain / Acute midline low back pain without sciatica / right sided back pain  - patient seen to describe her back pain and right hip pain has been chronic with improvement of pain from previous injections, however more recently has been flaring up due to 2 different falls.  Will give Toradol 30 mg IM today, will give Voltaren gel, switch from tizanidine/Robaxin to cyclobenzaprine.  Refer to orthopedics for further evaluation and possible injection  - ketorolac injection 30 mg  - diclofenac sodium (VOLTAREN) 1 % Gel; Apply 2 g topically 4 (four) times daily.  Dispense: 100 g; Refill: 1        Follow up:   No follow-ups on file.     Other Orders Placed This Visit:  Orders Placed This Encounter   Procedures    CBC Auto Differential     Standing Status:   Future     Standing Expiration Date:   6/10/2025    Iron and TIBC     Standing Status:   Future     Standing Expiration Date:   6/10/2025    Ferritin     Standing Status:   Future     Standing Expiration Date:   6/10/2025    Ambulatory referral/consult to Psychiatry     Standing Status:   Future     Standing Expiration Date:   7/10/2025     Referral Priority:   Routine     Referral Type:   Psychiatric     Referral Reason:   Specialty Services Required     Requested Specialty:   Psychiatry     Number of Visits Requested:   1    Ambulatory referral/consult to  Psychology     Standing Status:   Future     Standing Expiration Date:   7/10/2025     Referral Priority:   Routine     Referral Type:   Psychiatric     Referral Reason:   Specialty Services Required     Requested Specialty:   Psychology     Number of Visits Requested:   1    Ambulatory referral/consult to Orthopedics     Standing Status:   Future     Standing Expiration Date:   7/10/2025     Referral Priority:   Routine     Referral Type:   Consultation     Requested Specialty:   Orthopedic Surgery     Number of Visits Requested:   1           Ilir Echevarria MD        This note is dictated on M*Modal word recognition program.  There are word recognition mistakes that are occasionally missed on review.      I spent a total of 50 minutes on the day of the visit.  This includes face to face time and non-face to face time preparing to see the patient (eg, review of tests), obtaining and/or reviewing separately obtained history, documenting clinical information in the electronic or other health record, independently interpreting results and communicating results to the patient/family/caregiver, or care coordinator.

## 2024-06-11 ENCOUNTER — TELEPHONE (OUTPATIENT)
Dept: PSYCHIATRY | Facility: CLINIC | Age: 54
End: 2024-06-11
Payer: COMMERCIAL

## 2024-06-11 NOTE — TELEPHONE ENCOUNTER
Pt called in to sched a np appt for med man. I kindly asked if she wanted to sched with the [facility] if so to hang up and press opt 3. She got pset and said she isnt pressing opt 3 since they do not answer the phone when she calls, I sched a np appt for 6/20 at 9am with tal torres

## 2024-06-11 NOTE — TELEPHONE ENCOUNTER
Called pt to inform her that her insurance doesn't cover her visit and we aren't able to do self pay due to secondary insurance being medicaid.   Lvm for her to call back if she has questions.

## 2024-06-12 ENCOUNTER — TELEPHONE (OUTPATIENT)
Dept: PSYCHIATRY | Facility: CLINIC | Age: 54
End: 2024-06-12
Payer: COMMERCIAL

## 2024-06-12 ENCOUNTER — TELEPHONE (OUTPATIENT)
Dept: PRIMARY CARE CLINIC | Facility: CLINIC | Age: 54
End: 2024-06-12
Payer: COMMERCIAL

## 2024-06-12 NOTE — TELEPHONE ENCOUNTER
----- Message from Komal Sanchez sent at 6/12/2024 11:57 AM CDT -----  Contact: pT [phone]  Requesting an RX refill or new RX.  Is this a refill or new RX: NEW RX  RX name and strength (copy/paste from chart): Adderall ( don't see in chart)  Is this a 30 day or 90 day RX: up to doctor  Pharmacy name and phone # (copy/paste from chart):    [id number] - [place] - [address] [facility] [address]  [facility] [place] [address]  Phone: [phone] Fax: [phone]    Comment: Pt ask for adderall because vyvanse doesn't work for her & gives her lack of focus.      Please Call and advise    Thank you    Please do NOT rep[ly to sender as this is from the call center and they answer incoming calls only.

## 2024-06-12 NOTE — TELEPHONE ENCOUNTER
----- Message from Kelsi White MA sent at 6/12/2024  7:47 AM CDT -----    ----- Message -----  From: Cecy Natarajan  Sent: 6/11/2024   5:52 PM CDT  To: [facility] Psych Clinical Staff    Type:  Patient Returning Call    Who Called: Pt  Who Left Message for Patient:  Does the patient know what this is regarding?: appt cancelled  Would the patient rather a call back or a response via [facility]? Call  Best Call Back Number:  [phone]  Additional Information: Pt would like to speak to someone in office related to her appt being cancelled.  Pt states she was informed via telephone that her insurance does not cover services.  Pt states she has two different insurance coverages and need to speak to someone in office directly.  Pt expressed her dissatisfaction of appt being cancelled prior to speaking to her directly.  Pt states appt was scheduled for this week and she would like to be rescheduled for another appt this week as it is urgent that she be seen by provider as she was previously scheduled.

## 2024-06-12 NOTE — TELEPHONE ENCOUNTER
Spoke with patient regarding her insurance for behavioral health coverage. Patient is out of network with [facility] for [facility] Exchange plan, she does not have out of network coverage. She cannot be self pay due to Medicaid and we do not have any openings for Medicaid. Patient verbalized understanding and said she is going to reach out to different providers to see if they accept her insurance.

## 2024-06-17 ENCOUNTER — E-VISIT (OUTPATIENT)
Dept: PRIMARY CARE CLINIC | Facility: CLINIC | Age: 54
End: 2024-06-17
Payer: COMMERCIAL

## 2024-06-17 DIAGNOSIS — M25.551 RIGHT HIP PAIN: Primary | ICD-10-CM

## 2024-06-17 DIAGNOSIS — M54.50 ACUTE MIDLINE LOW BACK PAIN WITHOUT SCIATICA: ICD-10-CM

## 2024-06-19 ENCOUNTER — PATIENT MESSAGE (OUTPATIENT)
Dept: PSYCHIATRY | Facility: CLINIC | Age: 54
End: 2024-06-19
Payer: COMMERCIAL

## 2024-06-19 NOTE — PROGRESS NOTES
Patient ID: Rehana England is a 54 y.o. female.    Chief Complaint: Back Pain (Entered automatically based on patient selection in Patient Portal.)    The patient initiated a request through [email or web address] on 6/17/2024 for evaluation and management with a chief complaint of Back Pain (Entered automatically based on patient selection in Patient Portal.)     I evaluated the questionnaire submission on 06/19/2024  .    Ohs Peq Evisit Back Pain    6/18/2024  5:41 PM CDT - Filed by Patient   Do you agree to participate in an E-Visit? Yes   If you have any of the following symptoms, please present to your local emergency room or call 911: I acknowledge   What is the main issue you would like addressed today? Flexeril isnt controlling pain as prescribed.   Where are you having pain? Middle Back   Does the pain extend into your legs? Yes, into both legs   How bad is the pain? The pain is severe   Did you have an injury that caused the pain? Yes, the pain started after an injury   Please describe the circumstances of your injury. Severe.   How long has the pain been present? More than 4 weeks   Have you had back pain in the past? Yes, I have infrequently had pain similar to this before   Please list any medications or treatments you have used for back pain and indicate if it was effective or not. Robaxin; Flexeril; Naproxen ; Ibuprofen ; Tizanidine   Do you have a fever? No, I do not have a fever   Do you have any of the following? Fatigue;  Weakness   What makes the pain worse? Bending over;  Strenuous activity   What makes the pain better? Lying in bed;  Prescription pain medicine   Have you ever been diagnosed with cancer? No   Have you ever been diagnosed with degenerative disc disease (arthritis of the spine)? I am not sure   Have you ever been diagnosed with osteoporosis or any other bone weakness? No   Have you ever had surgery on your back or spine? No   What is your usual health status? My activity is physically  restricted   Provide any additional information you feel is important.    Please attach any relevant images or files    Are you able to take your vital signs? Yes   Systolic Blood Pressure: 90   Diastolic Blood Pressure: 58   Weight: 145   Height: 65   Pulse: 50   Temperature: 98.3   Respiration rate: 20   Pulse Oxygen: 99         Encounter Diagnoses   Name Primary?    Right hip pain Yes    Acute midline low back pain without sciatica         Orders Placed This Encounter   Procedures    Ambulatory referral/consult to Pain Clinic     Standing Status:   Future     Standing Expiration Date:   7/19/2025     Referral Priority:   Routine     Referral Type:   Consultation     Referral Reason:   Specialty Services Required     Requested Specialty:   Pain Medicine     Number of Visits Requested:   1            No follow-ups on file.      E-Visit Time Tracking:

## 2024-06-20 ENCOUNTER — TELEPHONE (OUTPATIENT)
Dept: PRIMARY CARE CLINIC | Facility: CLINIC | Age: 54
End: 2024-06-20

## 2024-06-20 NOTE — TELEPHONE ENCOUNTER
----- Message from Isabelle Young sent at 6/20/2024 11:32 AM CDT -----  Type: General Call Back     Name of Caller:BERTRAND SKY [[id number]]  Reason for call back?:cyclobenzaprine (FLEXERIL) 5 MG tablet and diclofenac sodium (VOLTAREN) 1 % Gel  Best Call Back Number:[phone]  Additional Information: patient states she was seen by the provider recently and prescribed these two medications. Patient states she is not experiencing any relief from this medication and is still in pain. Patient states she would like to have the provider call in her previous medication that she states has worked and the only thing that has ever helped. Patient states she would like a HYDROcodone-acetaminophen (NORCO) 5-325 mg per tablet medication called in as soon as possible. Please call back with further assistance and more information as soon as possible. Patient states if the provider has any other medication to try she is willing to accept that as well but this current medication is not helping at all.

## 2024-07-11 ENCOUNTER — PATIENT MESSAGE (OUTPATIENT)
Dept: PSYCHIATRY | Facility: CLINIC | Age: 54
End: 2024-07-11
Payer: COMMERCIAL

## 2024-07-19 ENCOUNTER — PATIENT OUTREACH (OUTPATIENT)
Dept: ADMINISTRATIVE | Facility: HOSPITAL | Age: 54
End: 2024-07-19
Payer: COMMERCIAL

## 2024-07-19 NOTE — LETTER
July 19, 2024    Rehana Anderson [place]  [address]  [place] [address]             Dear Rehana    In addition to helping you feel better when you are sick, we are interested in preventing illness and injury in the first place.  Screening tests and routine follow up tests when you have certain medical problems are very essential in helping you stay as healthy as possible. In the spirit of maintaining your health, our system indicates that you are due for the following:    Health Maintenance Due   Topic Date Due    Pneumococcal Vaccines (Age 0-64) (1 of 2 - PCV) Never done    HIV Screening  Never done    TETANUS VACCINE  Never done    Mammogram  Never done    Colorectal Cancer Screening  Never done    Shingles Vaccine (1 of 2) Never done    COVID-19 Vaccine (3 - 2023-24 season) 09/01/2023        Please be prepared to discuss these recommended tests at your next visit.  If you haven't had a visit within the past one year please call [phone] to schedule or request an appointment.    Sincerely,       Your Health Care Team

## 2024-07-19 NOTE — PROGRESS NOTES
Health Maintenance Due   Topic Date Due    Pneumococcal Vaccines (Age 0-64) (1 of 2 - PCV) Never done    HIV Screening  Never done    TETANUS VACCINE  Never done    Mammogram  Never done    Colorectal Cancer Screening  Never done    Shingles Vaccine (1 of 2) Never done    COVID-19 Vaccine (3 - 2023-24 season) 09/01/2023      Call not reachable

## 2024-07-24 ENCOUNTER — TELEPHONE (OUTPATIENT)
Dept: PSYCHIATRY | Facility: CLINIC | Age: 54
End: 2024-07-24
Payer: COMMERCIAL

## 2024-07-24 NOTE — TELEPHONE ENCOUNTER
"Attempted to call x2 to reschedule STeP Intake, but patient was "unreachable." Will attempt to send 2nd patient message.  "

## 2024-10-01 RX ORDER — FERROUS SULFATE 324(65)MG
TABLET, DELAYED RELEASE (ENTERIC COATED) ORAL
Qty: 90 TABLET | Refills: 1 | OUTPATIENT
Start: 2024-10-01

## 2024-10-01 NOTE — TELEPHONE ENCOUNTER
----- Message from Angella sent at 10/1/2024  2:21 PM CDT -----  Contact: Pt  [phone]  1MEDICALADVICE     Patient is calling for Medical Advice regarding: Sinus infection, sore throat/mild fever    How long has patient had these symptoms: 4 days    Pharmacy name and phone#:   [facility] #[id number] [address] [address]  Phone: [phone] Fax: [phone]      Patient wants a call back or thru [facility]: Call back    Comments:    Please call and advise.    Thank You      Please advise patient replies from provider may take up to 48 hours.

## 2024-10-02 ENCOUNTER — E-VISIT (OUTPATIENT)
Dept: PRIMARY CARE CLINIC | Facility: CLINIC | Age: 54
End: 2024-10-02
Payer: COMMERCIAL

## 2024-10-02 DIAGNOSIS — J06.9 UPPER RESPIRATORY TRACT INFECTION, UNSPECIFIED TYPE: Primary | ICD-10-CM

## 2024-10-04 RX ORDER — PROMETHAZINE HYDROCHLORIDE AND DEXTROMETHORPHAN HYDROBROMIDE 6.25; 15 MG/5ML; MG/5ML
5 SYRUP ORAL EVERY 4 HOURS PRN
Qty: 118 ML | Refills: 0 | Status: SHIPPED | OUTPATIENT
Start: 2024-10-04

## 2024-10-04 NOTE — PROGRESS NOTES
Patient ID: Rehana England is a 54 y.o. female.    Chief Complaint: General Illness (Entered automatically based on patient selection in [employer].)    The patient initiated a request through [employer] on 10/2/2024 for evaluation and management with a chief complaint of General Illness (Entered automatically based on patient selection in [employer].)     I evaluated the questionnaire submission on 10/04/2024  .    Ohs Peq Evisit Supergroup-Cough And Cold    10/3/2024 12:53 PM CDT - Filed by Patient   What do you need help with? Sinus Infection   Do you agree to participate in an E-Visit? Yes   If you have any of the following symptoms, go to your local emergency room or call 911: I acknowledge   What is the main issue you would like addressed today? Sore throat cough sinus drip   Do you think you might have COVID or the Flu? No   Have you tested positive for COVID or Flu? No   What symptoms do you currently have?  Cough;  Headache;  Nasal Congestion;  New loss of taste or smell;  Sore throat   Describe your cough: Productive (containing mucus)   Describe the mucus: Clear   Have you had any of the following? Trouble swallowing   Please enter a few details about your swallowing, breathing, or visual problems. Uncomfortable   Have you ever smoked? I have never smoked   Have you had a fever? No   When did your symptoms first appear? 10/1/2024   In the last two weeks, have you been in close contact with someone who has COVID-19 or the Flu? No   List what you have done or taken to help your symptoms. Throat rinse   How severe are your symptoms? Mild   Have your symptoms gotten better or worse since they started?  No change   Do you have transportation to get testing if it is needed and ordered for you at an [facility] location? No   Provide any additional information you feel is important.    Please attach any relevant images or files    Are you able to take your vital signs? Yes   Systolic Blood Pressure: 117   Diastolic  Blood Pressure: 77   Weight: 148   Height: 55   Pulse: 54   Temperature: 98.8   Respiration rate: 16   Pulse Oxygen:          Encounter Diagnosis   Name Primary?    Upper respiratory tract infection, unspecified type Yes        No orders of the defined types were placed in this encounter.     Medications Ordered This Encounter   Medications    promethazine-dextromethorphan (PROMETHAZINE-DM) 6.25-15 mg/5 mL Syrp     Sig: Take 5 mLs by mouth every 4 (four) hours as needed (cough).     Dispense:  118 mL     Refill:  0        No follow-ups on file.      E-Visit Time Trackin min

## 2024-10-07 ENCOUNTER — PATIENT MESSAGE (OUTPATIENT)
Dept: PRIMARY CARE CLINIC | Facility: CLINIC | Age: 54
End: 2024-10-07
Payer: COMMERCIAL

## 2024-10-08 ENCOUNTER — TELEPHONE (OUTPATIENT)
Dept: PRIMARY CARE CLINIC | Facility: CLINIC | Age: 54
End: 2024-10-08
Payer: COMMERCIAL

## 2024-10-08 NOTE — TELEPHONE ENCOUNTER
----- Message from Marcello sent at 10/3/2024  5:18 PM CDT -----  Contact: [phone]    ----- Message -----  From: Steffi Vargas  Sent: 10/3/2024   1:08 PM CDT  To: Curtis Sawant Staff    1MEDICALADVICE     Patient is calling for Medical Advice regarding:Possible uti     How long has patient had these symptoms:about a day and a half     Pharmacy name and phone#:  [facility] #[id number] - [place] - [address] AT [facility] [address]  [facility] [place] [address]  Phone: [phone] Fax: [phone]      Patient wants a call back or thru [facility]:Call back    Please call pt and advise

## 2025-01-13 DIAGNOSIS — F41.9 ANXIETY: ICD-10-CM

## 2025-01-13 NOTE — TELEPHONE ENCOUNTER
Care Due:                  Date            Visit Type   Department     Provider  --------------------------------------------------------------------------------                                SAME DAY -                              ESTABLISHED   [facility]  Last Visit: 06-      PATIENT      CARE           ALEKS MCKINNEY  Next Visit: None Scheduled  None         None Found                                                            Last  Test          Frequency    Reason                     Performed    Due Date  --------------------------------------------------------------------------------    CBC.........  12 months..  diclofenac, ibuprofen,     02- 01-                             mirtazapine..............    CMP.........  12 months..  diclofenac, ibuprofen,     02- 01-                             mirtazapine..............    [employer] Embedded Care Due Messages. Reference number: [id number].   1/13/2025 4:56:51 AM CST

## 2025-01-18 RX ORDER — MIRTAZAPINE 7.5 MG/1
7.5 TABLET, FILM COATED ORAL NIGHTLY
Qty: 90 TABLET | Refills: 0 | Status: SHIPPED | OUTPATIENT
Start: 2025-01-18

## 2025-01-18 RX ORDER — PAROXETINE HYDROCHLORIDE 20 MG/1
20 TABLET, FILM COATED ORAL
Qty: 30 TABLET | Refills: 0 | Status: SHIPPED | OUTPATIENT
Start: 2025-01-18

## 2025-02-12 ENCOUNTER — PATIENT OUTREACH (OUTPATIENT)
Dept: ADMINISTRATIVE | Facility: HOSPITAL | Age: 55
End: 2025-02-12
Payer: COMMERCIAL

## 2025-04-24 ENCOUNTER — TELEPHONE (OUTPATIENT)
Dept: PRIMARY CARE CLINIC | Facility: CLINIC | Age: 55
End: 2025-04-24
Payer: COMMERCIAL

## 2025-04-24 NOTE — TELEPHONE ENCOUNTER
----- Message from Marcello sent at 4/23/2025 10:19 PM CDT -----  Contact: [phone]@[email or web address]    ----- Message -----  From: Murali aL  Sent: 4/23/2025   4:25 PM CDT  To: Curtis Sawant Staff    Patient wants to be seen today by their PCP only. Caller will not accept being placed on the wait list and is requesting a message be sent to the provider.When is the next available appointment:  None shown Symptoms:  annual Would you prefer an answer via [facility]?: No  Comments:  Please call pt to advise

## 2025-05-12 ENCOUNTER — TELEPHONE (OUTPATIENT)
Dept: PRIMARY CARE CLINIC | Facility: CLINIC | Age: 55
End: 2025-05-12
Payer: COMMERCIAL

## 2025-05-12 NOTE — TELEPHONE ENCOUNTER
----- Message from Tyron Leon sent at 5/12/2025  8:52 AM CDT -----    ----- Message -----  From: Zo Kuhn  Sent: 5/12/2025   7:57 AM CDT  To: Curtis Romero Staff    Symptoms: Back Injury, Neck Injury, Shoulder Injury, HeadacheOutcome: Instruct patient to Call 911 NOW!Reason: This is the only possible outcome for these symptomsThe caller rejected this outcome.

## 2025-05-12 NOTE — TELEPHONE ENCOUNTER
----- Message from Eladia sent at 5/12/2025 11:01 AM CDT -----  Contact: [phone] Patient  Caller is requesting an earlier appointment then we can schedule.  Caller is requesting a message be sent to the provider.If this is for urgent care symptoms, did you offer other providers at this location, providers at other locations, or [facility]? (yes, no, n/a):  yes denied pcp preferredIf this is for the patients physical, did you offer to schedule next available and put on wait list, or to see NP or PA for their physical?  (yes, no, n/a):  n/aWhen is the next available appointment with their provider:  05/28/25Reason for the appointment:  lower lumbar pain (sacrum area)Patient preference of timeframe to be scheduled:  first available - this week if possible - painWould the patient like a call back, or a response through their [facility] portal?:   callback Comments:

## 2025-05-23 ENCOUNTER — OFFICE VISIT (OUTPATIENT)
Dept: PRIMARY CARE CLINIC | Facility: CLINIC | Age: 55
End: 2025-05-23
Payer: COMMERCIAL

## 2025-05-23 VITALS
DIASTOLIC BLOOD PRESSURE: 66 MMHG | OXYGEN SATURATION: 100 % | SYSTOLIC BLOOD PRESSURE: 135 MMHG | BODY MASS INDEX: 24.37 KG/M2 | TEMPERATURE: 99 F | HEART RATE: 88 BPM | WEIGHT: 146.25 LBS | HEIGHT: 65 IN

## 2025-05-23 DIAGNOSIS — Z12.11 COLON CANCER SCREENING: ICD-10-CM

## 2025-05-23 DIAGNOSIS — R42 DIZZINESS: ICD-10-CM

## 2025-05-23 DIAGNOSIS — Z86.39 HISTORY OF IRON DEFICIENCY: ICD-10-CM

## 2025-05-23 DIAGNOSIS — M89.8X1 PAIN OF LEFT SCAPULA: ICD-10-CM

## 2025-05-23 DIAGNOSIS — M54.2 NECK PAIN: Primary | ICD-10-CM

## 2025-05-23 DIAGNOSIS — M25.551 CHRONIC RIGHT HIP PAIN: ICD-10-CM

## 2025-05-23 DIAGNOSIS — G89.29 CHRONIC RIGHT HIP PAIN: ICD-10-CM

## 2025-05-23 PROCEDURE — 1160F RVW MEDS BY RX/DR IN RCRD: CPT | Mod: CPTII,S$GLB,,

## 2025-05-23 PROCEDURE — 99214 OFFICE O/P EST MOD 30 MIN: CPT | Mod: S$GLB,,,

## 2025-05-23 PROCEDURE — 3078F DIAST BP <80 MM HG: CPT | Mod: CPTII,S$GLB,,

## 2025-05-23 PROCEDURE — 3008F BODY MASS INDEX DOCD: CPT | Mod: CPTII,S$GLB,,

## 2025-05-23 PROCEDURE — 1159F MED LIST DOCD IN RCRD: CPT | Mod: CPTII,S$GLB,,

## 2025-05-23 PROCEDURE — 3075F SYST BP GE 130 - 139MM HG: CPT | Mod: CPTII,S$GLB,,

## 2025-05-23 PROCEDURE — 99999 PR PBB SHADOW E&M-EST. PATIENT-LVL IV: CPT | Mod: PBBFAC,,,

## 2025-05-23 RX ORDER — DEXTROAMPHETAMINE SACCHARATE, AMPHETAMINE ASPARTATE MONOHYDRATE, DEXTROAMPHETAMINE SULFATE AND AMPHETAMINE SULFATE 5; 5; 5; 5 MG/1; MG/1; MG/1; MG/1
20 CAPSULE, EXTENDED RELEASE ORAL EVERY MORNING
COMMUNITY
Start: 2025-05-01

## 2025-05-23 RX ORDER — METHOCARBAMOL 500 MG/1
500 TABLET, FILM COATED ORAL EVERY 8 HOURS PRN
Qty: 30 TABLET | Refills: 0 | Status: SHIPPED | OUTPATIENT
Start: 2025-05-23 | End: 2025-06-02

## 2025-05-23 RX ORDER — FERROUS SULFATE 324(65)MG
324 TABLET, DELAYED RELEASE (ENTERIC COATED) ORAL DAILY
Qty: 90 TABLET | Refills: 1 | Status: SHIPPED | OUTPATIENT
Start: 2025-05-23
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
## 2025-05-28 ENCOUNTER — PATIENT OUTREACH (OUTPATIENT)
Dept: ADMINISTRATIVE | Facility: OTHER | Age: 55
End: 2025-05-28
Payer: COMMERCIAL

## 2025-05-29 ENCOUNTER — PATIENT MESSAGE (OUTPATIENT)
Dept: PRIMARY CARE CLINIC | Facility: CLINIC | Age: 55
End: 2025-05-29
Payer: COMMERCIAL

## 2025-06-02 ENCOUNTER — LAB VISIT (OUTPATIENT)
Dept: PRIMARY CARE CLINIC | Facility: CLINIC | Age: 55
End: 2025-06-02
Payer: MEDICAID

## 2025-06-02 ENCOUNTER — TELEPHONE (OUTPATIENT)
Dept: PRIMARY CARE CLINIC | Facility: CLINIC | Age: 55
End: 2025-06-02
Payer: MEDICAID

## 2025-06-02 DIAGNOSIS — R42 DIZZINESS: ICD-10-CM

## 2025-06-02 DIAGNOSIS — Z86.39 HISTORY OF IRON DEFICIENCY: ICD-10-CM

## 2025-06-02 LAB
ALBUMIN SERPL BCP-MCNC: 4.5 G/DL (ref 3.5–5.2)
ALP SERPL-CCNC: 167 UNIT/L (ref 40–150)
ALT SERPL W/O P-5'-P-CCNC: 12 UNIT/L (ref 10–44)
ANION GAP (OHS): 12 MMOL/L (ref 8–16)
AST SERPL-CCNC: 20 UNIT/L (ref 11–45)
BILIRUB SERPL-MCNC: 0.2 MG/DL (ref 0.1–1)
BUN SERPL-MCNC: 9 MG/DL (ref 6–20)
CALCIUM SERPL-MCNC: 9.7 MG/DL (ref 8.7–10.5)
CHLORIDE SERPL-SCNC: 105 MMOL/L (ref 95–110)
CO2 SERPL-SCNC: 25 MMOL/L (ref 23–29)
CREAT SERPL-MCNC: 0.8 MG/DL (ref 0.5–1.4)
FERRITIN SERPL-MCNC: 159 NG/ML (ref 20–300)
GFR SERPLBLD CREATININE-BSD FMLA CKD-EPI: >60 ML/MIN/1.73/M2
GLUCOSE SERPL-MCNC: 83 MG/DL (ref 70–110)
IRON SATN MFR SERPL: 18 % (ref 20–50)
IRON SERPL-MCNC: 62 UG/DL (ref 30–160)
POTASSIUM SERPL-SCNC: 3.7 MMOL/L (ref 3.5–5.1)
PROT SERPL-MCNC: 8.2 GM/DL (ref 6–8.4)
SODIUM SERPL-SCNC: 142 MMOL/L (ref 136–145)
TIBC SERPL-MCNC: 342 UG/DL (ref 250–450)
TRANSFERRIN SERPL-MCNC: 231 MG/DL (ref 200–375)

## 2025-06-02 PROCEDURE — 80053 COMPREHEN METABOLIC PANEL: CPT

## 2025-06-02 PROCEDURE — 82728 ASSAY OF FERRITIN: CPT

## 2025-06-02 PROCEDURE — 84466 ASSAY OF TRANSFERRIN: CPT

## 2025-06-05 ENCOUNTER — OFFICE VISIT (OUTPATIENT)
Dept: PRIMARY CARE CLINIC | Facility: CLINIC | Age: 55
End: 2025-06-05
Payer: MEDICAID

## 2025-06-05 DIAGNOSIS — Z01.89 PATIENT REQUEST FOR DIAGNOSTIC TESTING: ICD-10-CM

## 2025-06-05 DIAGNOSIS — Z11.3 SCREENING EXAMINATION FOR STD (SEXUALLY TRANSMITTED DISEASE): Primary | ICD-10-CM

## 2025-06-05 DIAGNOSIS — M54.2 NECK PAIN: ICD-10-CM

## 2025-06-05 RX ORDER — ATOMOXETINE 40 MG/1
40 CAPSULE ORAL
COMMUNITY
Start: 2025-01-19

## 2025-06-05 RX ORDER — VILAZODONE HYDROCHLORIDE 20 MG/1
1 TABLET ORAL
COMMUNITY
Start: 2025-01-20

## 2025-06-05 RX ORDER — BACLOFEN 5 MG/1
5 TABLET ORAL 3 TIMES DAILY PRN
Qty: 15 TABLET | Refills: 0 | Status: SHIPPED | OUTPATIENT
Start: 2025-06-05 | End: 2025-06-10

## 2025-06-05 RX ORDER — VILOXAZINE HYDROCHLORIDE 200 MG/1
1 CAPSULE, EXTENDED RELEASE ORAL EVERY MORNING
COMMUNITY
Start: 2025-01-20

## 2025-06-23 ENCOUNTER — HOSPITAL ENCOUNTER (EMERGENCY)
Facility: HOSPITAL | Age: 55
Discharge: HOME | End: 2025-06-23
Attending: EMERGENCY MEDICINE
Payer: COMMERCIAL

## 2025-06-23 VITALS
HEART RATE: 56 BPM | RESPIRATION RATE: 16 BRPM | HEIGHT: 65 IN | TEMPERATURE: 98 F | BODY MASS INDEX: 24.32 KG/M2 | DIASTOLIC BLOOD PRESSURE: 83 MMHG | SYSTOLIC BLOOD PRESSURE: 104 MMHG | WEIGHT: 146 LBS | OXYGEN SATURATION: 97 %

## 2025-06-23 DIAGNOSIS — M54.50 ACUTE MIDLINE LOW BACK PAIN WITHOUT SCIATICA: Primary | ICD-10-CM

## 2025-06-23 DIAGNOSIS — M54.6 ACUTE RIGHT-SIDED THORACIC BACK PAIN: ICD-10-CM

## 2025-06-23 DIAGNOSIS — R10.9 FLANK PAIN, ACUTE: ICD-10-CM

## 2025-06-23 PROCEDURE — 96372 THER/PROPH/DIAG INJ SC/IM: CPT | Performed by: EMERGENCY MEDICINE

## 2025-06-23 PROCEDURE — 63600175 PHARM REV CODE 636 W HCPCS: Performed by: EMERGENCY MEDICINE

## 2025-06-23 PROCEDURE — 25000003 PHARM REV CODE 250: Performed by: EMERGENCY MEDICINE

## 2025-06-23 PROCEDURE — 99284 EMERGENCY DEPT VISIT MOD MDM: CPT | Mod: 25

## 2025-06-23 RX ORDER — KETOROLAC TROMETHAMINE 30 MG/ML
60 INJECTION, SOLUTION INTRAMUSCULAR; INTRAVENOUS
Status: COMPLETED | OUTPATIENT
Start: 2025-06-23 | End: 2025-06-23

## 2025-06-23 RX ORDER — METHOCARBAMOL 500 MG/1
1000 TABLET, FILM COATED ORAL 4 TIMES DAILY
Qty: 80 TABLET | Refills: 0 | Status: SHIPPED | OUTPATIENT
Start: 2025-06-23 | End: 2025-07-03

## 2025-06-23 RX ORDER — LIDOCAINE 50 MG/G
1 PATCH TOPICAL DAILY
Qty: 10 PATCH | Refills: 0 | Status: SHIPPED | OUTPATIENT
Start: 2025-06-23

## 2025-06-23 RX ORDER — ORPHENADRINE CITRATE 30 MG/ML
60 INJECTION INTRAMUSCULAR; INTRAVENOUS
Status: COMPLETED | OUTPATIENT
Start: 2025-06-23 | End: 2025-06-23

## 2025-06-23 RX ORDER — LIDOCAINE 50 MG/G
1 PATCH TOPICAL
Status: DISCONTINUED | OUTPATIENT
Start: 2025-06-23 | End: 2025-06-23 | Stop reason: HOSPADM

## 2025-06-23 RX ORDER — IBUPROFEN 400 MG/1
800 TABLET, FILM COATED ORAL
Status: COMPLETED | OUTPATIENT
Start: 2025-06-23 | End: 2025-06-23

## 2025-06-23 RX ADMIN — IBUPROFEN 800 MG: 400 TABLET, FILM COATED ORAL at 03:06

## 2025-06-23 RX ADMIN — LIDOCAINE 1 PATCH: 50 PATCH TOPICAL at 04:06

## 2025-06-23 RX ADMIN — KETOROLAC TROMETHAMINE 60 MG: 30 INJECTION, SOLUTION INTRAMUSCULAR; INTRAVENOUS at 04:06

## 2025-06-23 RX ADMIN — ORPHENADRINE CITRATE 60 MG: 60 INJECTION INTRAMUSCULAR; INTRAVENOUS at 04:06
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
## 2025-06-24 ENCOUNTER — PATIENT OUTREACH (OUTPATIENT)
Dept: ADMINISTRATIVE | Facility: HOSPITAL | Age: 55
End: 2025-06-24
Payer: COMMERCIAL

## 2025-06-24 ENCOUNTER — PATIENT MESSAGE (OUTPATIENT)
Dept: ADMINISTRATIVE | Facility: CLINIC | Age: 55
End: 2025-06-24
Payer: COMMERCIAL

## 2025-06-24 ENCOUNTER — TELEPHONE (OUTPATIENT)
Dept: PRIMARY CARE CLINIC | Facility: CLINIC | Age: 55
End: 2025-06-24
Payer: COMMERCIAL

## 2025-06-24 DIAGNOSIS — Z12.12 ENCOUNTER FOR COLORECTAL CANCER SCREENING: Primary | ICD-10-CM

## 2025-06-24 DIAGNOSIS — Z12.11 ENCOUNTER FOR COLORECTAL CANCER SCREENING: Primary | ICD-10-CM

## 2025-06-24 NOTE — TELEPHONE ENCOUNTER
Copied from CRM #[id number]. Topic: General Inquiry - Patient Advice  >> Jun 24, 2025  1:57 PM Latonya wrote:  .1MEDICALADVICE     Patient is calling for Medical Advice regarding: Calling to speak with the nurse regarding her recent hospital stay. Medication is not releaving her pain. Please call her. Thanks.    How long has patient had these symptoms: N/A    Pharmacy name and phone#: N/A    [facility], provide patient's call back phone number: N/A    Comments: N/A    Please advise patient replies from provider may take up to 48 hours.

## 2025-06-25 DIAGNOSIS — Z12.31 OTHER SCREENING MAMMOGRAM: ICD-10-CM

## 2025-06-30 ENCOUNTER — TELEPHONE (OUTPATIENT)
Dept: ENDOSCOPY | Facility: HOSPITAL | Age: 55
End: 2025-06-30
Payer: COMMERCIAL

## 2025-06-30 NOTE — TELEPHONE ENCOUNTER
Contacted the patient to schedule Colonoscopy. The patient did not answer the call. Voice message left requesting a call back and portal message sent.

## 2025-07-11 ENCOUNTER — TELEPHONE (OUTPATIENT)
Dept: GASTROENTEROLOGY | Facility: CLINIC | Age: 55
End: 2025-07-11
Payer: COMMERCIAL

## 2025-07-11 ENCOUNTER — TELEPHONE (OUTPATIENT)
Dept: ENDOSCOPY | Facility: HOSPITAL | Age: 55
End: 2025-07-11
Payer: COMMERCIAL

## 2025-07-11 NOTE — TELEPHONE ENCOUNTER
Call placed to Ms. Anderson in regards to message received. No answer, left message to return call.     Insurance on file is OON with [facility]

## 2025-07-11 NOTE — TELEPHONE ENCOUNTER
Contacted the patient to schedule Colonoscopy. The patient did not answer the call. Voice message left requesting a call back and letter mailed.  Provider notified of cancelled referral.          Endoscopy Scheduling Department  [facility]    Date:  July 11, 2025    Medical Record # [id number]      Dear Rehana England,    An order for Colonoscopy was placed for you by Savana Acevedo MD .    Since multiple attempts have been made to get in touch with you, this is the last notification. Please call the scheduling nurse to schedule this procedure as soon as possible.    If you have already scheduled this appointment, please disregard this letter. If you would like to cancel this request, please call the number listed below.       Sincerely,        Endoscopy Scheduling Department [phone]      Office hours are Monday through Friday, 8 AM - 4:30 PM.        pt to tolerate po diet and meet at least 50-75% of estimated needs within 3-5 days

## 2025-07-11 NOTE — TELEPHONE ENCOUNTER
Copied from CRM #[id number]. Topic: General Inquiry - Patient Advice  >> Jul 11, 2025 Jenna wrote:  Type:  Needs Medical Advice    Who Called: pt  Symptoms (please be specific):    How long has patient had these symptoms:    Pharmacy name and phone #:    Would the patient rather a call back or a response via [facility]? call  Best Call Back Number: [phone]    Additional Information: pt is needing to saroj appt for sx.

## 2025-07-15 ENCOUNTER — TELEPHONE (OUTPATIENT)
Dept: GASTROENTEROLOGY | Facility: CLINIC | Age: 55
End: 2025-07-15
Payer: COMMERCIAL

## 2025-07-15 ENCOUNTER — TELEPHONE (OUTPATIENT)
Dept: PRIMARY CARE CLINIC | Facility: CLINIC | Age: 55
End: 2025-07-15
Payer: COMMERCIAL

## 2025-07-15 ENCOUNTER — TELEPHONE (OUTPATIENT)
Dept: PAIN MEDICINE | Facility: CLINIC | Age: 55
End: 2025-07-15
Payer: COMMERCIAL

## 2025-07-15 NOTE — TELEPHONE ENCOUNTER
Copied from CRM #[id number]. Topic: Appointments - Appointment Access  >> Jul 15, 2025  9:15 AM Leela wrote:  Type:  Appointment Request    Caller is requesting a sooner appointment.  Caller declined first available appointment listed below.  Caller will not accept being placed on the waitlist and is requesting a message be sent to doctor.    Name of Caller:  Patient  When is the first available appointment?  N/A    Symptoms:  back pain / neck pain / shoulder pain / hip pain    Would the patient rather a call back or a response via [facility]?  Call back  Best Call Back Number:  [phone]    Additional Information:   States she would like to speak with someone to schedule an appointment - referral is in EPIC - please call - thank you

## 2025-07-15 NOTE — TELEPHONE ENCOUNTER
Returned pt call to inform her insurance would not allow us to schedule her an ov.     Verified pt CC, pt states she wants a colon but has a huge hemorrhoid. Advised pt due to insurance issue she may be accepted at the GI location off [facility] or try [place] location. During call with pt, pt says someone named Cecy in [place] left a vm and that they were currently trying to call pt while we were on the phone.     Pt states she will speak with [facility]. No further questions at this time.

## 2025-07-15 NOTE — TELEPHONE ENCOUNTER
Copied from CRM #[id number]. Topic: General Inquiry - Return Call  >> Jul 15, 2025  8:58 AM Yu wrote:  Type: Needs Medical Advice         Who Called: pt  Best Call Back Number:[phone]  Additional Information: Requesting a call back regarding pt returned call to set appt.   Please Advise- Thank you

## 2025-07-15 NOTE — TELEPHONE ENCOUNTER
Copied from CRM #[id number]. Topic: General Inquiry - Patient Advice  >> Jul 15, 2025 10:07 AM Steffi wrote:  .1MEDICALADVICE     Patient is calling for Medical Advice regarding:pt is calling to inform provider that she has not been able to find a pain management clinic that's accepts her insurance on the [facility] , pt would like provider to fu in regards     Pt states the pain is still persistent & medications previously prescribed is not helping , pt would like to know if provider can possibly prescribe tylenol 3 or darvocet     How long has patient had these symptoms:    Pharmacy name and phone#:  [id number] - [place] - [address]  [address] [address]  Phone: [phone] Fax: [phone]        Patient wants a call back or thru [facility], provide patient's call back phone number:[phone] (Mobile)     Please call pt and advise

## 2025-07-15 NOTE — TELEPHONE ENCOUNTER
Call placed to Ms. England in regards to scheduling screening colonoscopy. No answer, left message to return call.

## 2025-07-15 NOTE — TELEPHONE ENCOUNTER
Returned pt call ,pt insurance was not in network   Pt is advise to contact her insurance company to assist her

## 2025-07-16 ENCOUNTER — TELEPHONE (OUTPATIENT)
Dept: PRIMARY CARE CLINIC | Facility: CLINIC | Age: 55
End: 2025-07-16
Payer: COMMERCIAL

## 2025-07-16 NOTE — TELEPHONE ENCOUNTER
----- Message from MAURICIO Hoyt sent at 7/11/2025  1:29 PM CDT -----  Regarding: colonoscopy referral  Dear Referring Provider and Staff,    The Endoscopy Scheduling Department has made 2+ attempts to reach the patient for scheduling their Colonoscopy. All final attempts have been made for this referral, if the referring provider would like the patient to receive endoscopic care, please confirm with the patient whether they would like to proceed. If so, then submit a new referral and inform the Pt that the Endoscopy Scheduling department will be contacting them to schedule their procedure.      Thank you,    Endoscopy Scheduling Department

## 2025-07-21 ENCOUNTER — PATIENT MESSAGE (OUTPATIENT)
Dept: GASTROENTEROLOGY | Facility: CLINIC | Age: 55
End: 2025-07-21
Payer: MEDICAID

## 2025-07-21 ENCOUNTER — TELEPHONE (OUTPATIENT)
Dept: GASTROENTEROLOGY | Facility: CLINIC | Age: 55
End: 2025-07-21
Payer: COMMERCIAL

## 2025-07-21 DIAGNOSIS — Z12.11 SCREENING FOR COLON CANCER: Primary | ICD-10-CM

## 2025-07-21 NOTE — TELEPHONE ENCOUNTER
Call placed to MsOneyda Tomás in regards to message received. I advised her I can schedule her colonoscopy but her insurance is out of network with our facility. She asked about her secondary insurance. I advised her that I'm not sure how the insurance part of this works I just have to make her aware the primary insurance is OON. She verbalized understanding and stated she would like to schedule. Offered her 8/18. She accepted. Prep instructions reviewed and sent to pt portal. No further issues noted.

## 2025-07-21 NOTE — TELEPHONE ENCOUNTER
Copied from CRM #[id number]. Topic: Appointments - Appointment Access  >> Jul 21, 2025  3:44 PM Thiago wrote:  Type:  Needs Medical Advice    Who Called: Pt  Would the patient rather a call back or a response via [facility]? Call  Best Call Back Number:[phone]   Additional Information: Pt has referral on file for pre-admit testing for colonoscopy. Pls call back and adv. Thank you.

## 2025-07-22 ENCOUNTER — TELEPHONE (OUTPATIENT)
Dept: PRIMARY CARE CLINIC | Facility: CLINIC | Age: 55
End: 2025-07-22
Payer: COMMERCIAL

## 2025-07-22 NOTE — TELEPHONE ENCOUNTER
Discussed lab results (CMP, ferritin, iron and TIBC) with patient.  Patient was given the opportunity to ask questions.   Also, discussed with patient complete blood count ordered prior to above labs has not been collected; can get done when she does labs ordered by Dr. Acevedo.  Patient voiced understanding of results and plan.

## 2025-07-25 ENCOUNTER — TELEPHONE (OUTPATIENT)
Dept: PRIMARY CARE CLINIC | Facility: CLINIC | Age: 55
End: 2025-07-25
Payer: COMMERCIAL

## 2025-07-25 NOTE — TELEPHONE ENCOUNTER
MA has spoken to pt multiple times to inform her that PCP is not sending stronger pain medication.

## 2025-07-25 NOTE — TELEPHONE ENCOUNTER
Copied from CRM #[id number]. Topic: Medications - Medication Refill  >> Jul 25, 2025 11:23 AM Demi wrote:  Type: Rx Clarification/ Additional Information/ Questions    Medication:pain medication she was given is not working Baclofen and Robaxin     What questions do you have about the medication, if any?she is asking for something else to be called     What information is needed?    Pharmacy number:    [id number] [address] [address]  Phone: [phone] Fax: [phone]            Pharmacy Contact Name and phone number:     Comments:  She states the robaxin was increased to 4000 a day and she is asking for a return call   Please call the pt at [phone]

## 2025-08-04 ENCOUNTER — PATIENT MESSAGE (OUTPATIENT)
Dept: PRIMARY CARE CLINIC | Facility: CLINIC | Age: 55
End: 2025-08-04
Payer: MEDICAID

## 2025-08-04 DIAGNOSIS — Z86.39 HISTORY OF IRON DEFICIENCY: ICD-10-CM

## 2025-08-04 RX ORDER — FERROUS SULFATE 324(65)MG
324 TABLET, DELAYED RELEASE (ENTERIC COATED) ORAL DAILY
Qty: 90 TABLET | Refills: 1 | Status: CANCELLED | OUTPATIENT
Start: 2025-08-04

## 2025-08-18 ENCOUNTER — OFFICE VISIT (OUTPATIENT)
Dept: PRIMARY CARE CLINIC | Facility: CLINIC | Age: 55
End: 2025-08-18
Payer: COMMERCIAL

## 2025-08-18 VITALS
WEIGHT: 138.31 LBS | RESPIRATION RATE: 12 BRPM | SYSTOLIC BLOOD PRESSURE: 127 MMHG | BODY MASS INDEX: 27.16 KG/M2 | DIASTOLIC BLOOD PRESSURE: 86 MMHG | HEART RATE: 76 BPM | TEMPERATURE: 98 F | HEIGHT: 60 IN | OXYGEN SATURATION: 100 %

## 2025-08-18 DIAGNOSIS — M54.50 ACUTE MIDLINE LOW BACK PAIN WITHOUT SCIATICA: ICD-10-CM

## 2025-08-18 DIAGNOSIS — M25.551 BILATERAL HIP PAIN: ICD-10-CM

## 2025-08-18 DIAGNOSIS — R19.8 RECTAL DISCHARGE: ICD-10-CM

## 2025-08-18 DIAGNOSIS — M25.552 BILATERAL HIP PAIN: ICD-10-CM

## 2025-08-18 DIAGNOSIS — M54.2 NECK PAIN: Primary | ICD-10-CM

## 2025-08-18 PROCEDURE — 99215 OFFICE O/P EST HI 40 MIN: CPT | Mod: PBBFAC,PN

## 2025-08-18 PROCEDURE — 99999 PR PBB SHADOW E&M-EST. PATIENT-LVL V: CPT | Mod: PBBFAC,,,

## 2025-08-18 RX ORDER — VILOXAZINE HYDROCHLORIDE 100 MG/1
1 CAPSULE, EXTENDED RELEASE ORAL EVERY MORNING
COMMUNITY
Start: 2025-06-04

## 2025-08-18 RX ORDER — DEXTROAMPHETAMINE SACCHARATE, AMPHETAMINE ASPARTATE, DEXTROAMPHETAMINE SULFATE AND AMPHETAMINE SULFATE 2.5; 2.5; 2.5; 2.5 MG/1; MG/1; MG/1; MG/1
1 TABLET ORAL 2 TIMES DAILY
COMMUNITY
Start: 2025-07-28

## 2025-08-18 RX ORDER — ARIPIPRAZOLE 2 MG/1
2 TABLET ORAL NIGHTLY
COMMUNITY
Start: 2025-07-28

## 2025-08-28 ENCOUNTER — PATIENT MESSAGE (OUTPATIENT)
Dept: GASTROENTEROLOGY | Facility: CLINIC | Age: 55
End: 2025-08-28
Payer: COMMERCIAL

## 2025-08-29 DIAGNOSIS — Z12.31 ENCOUNTER FOR SCREENING MAMMOGRAM FOR MALIGNANT NEOPLASM OF BREAST: Primary | ICD-10-CM
